# Patient Record
Sex: MALE | Race: WHITE | NOT HISPANIC OR LATINO | Employment: FULL TIME | URBAN - NONMETROPOLITAN AREA
[De-identification: names, ages, dates, MRNs, and addresses within clinical notes are randomized per-mention and may not be internally consistent; named-entity substitution may affect disease eponyms.]

---

## 2017-11-20 ENCOUNTER — HOSPITAL ENCOUNTER (EMERGENCY)
Facility: HOSPITAL | Age: 47
Discharge: HOME | End: 2017-11-20
Attending: FAMILY MEDICINE | Admitting: FAMILY MEDICINE

## 2017-11-20 ENCOUNTER — APPOINTMENT (OUTPATIENT)
Dept: CT IMAGING | Facility: HOSPITAL | Age: 47
End: 2017-11-20

## 2017-11-20 VITALS
OXYGEN SATURATION: 98 % | SYSTOLIC BLOOD PRESSURE: 112 MMHG | HEART RATE: 82 BPM | RESPIRATION RATE: 18 BRPM | TEMPERATURE: 98 F | HEIGHT: 73 IN | BODY MASS INDEX: 32.6 KG/M2 | WEIGHT: 246 LBS | DIASTOLIC BLOOD PRESSURE: 74 MMHG

## 2017-11-20 DIAGNOSIS — K57.92 DIVERTICULITIS OF INTESTINE WITHOUT PERFORATION OR ABSCESS WITHOUT BLEEDING, UNSPECIFIED PART OF INTESTINAL TRACT: Primary | ICD-10-CM

## 2017-11-20 LAB
ALBUMIN SERPL-MCNC: 4.4 G/DL (ref 3.5–5)
ALBUMIN/GLOB SERPL: 1.4 G/DL (ref 1.5–2.5)
ALP SERPL-CCNC: 119 U/L (ref 40–129)
ALT SERPL W P-5'-P-CCNC: 33 U/L (ref 10–44)
ANION GAP SERPL CALCULATED.3IONS-SCNC: 8 MMOL/L (ref 3.6–11.2)
APTT PPP: 31.4 SECONDS (ref 23.8–36.1)
AST SERPL-CCNC: 28 U/L (ref 10–34)
BASOPHILS # BLD AUTO: 0.04 10*3/MM3 (ref 0–0.3)
BASOPHILS NFR BLD AUTO: 0.3 % (ref 0–2)
BILIRUB SERPL-MCNC: 0.4 MG/DL (ref 0.2–1.8)
BILIRUB UR QL STRIP: NEGATIVE
BUN BLD-MCNC: 10 MG/DL (ref 7–21)
BUN/CREAT SERPL: 10.8 (ref 7–25)
CALCIUM SPEC-SCNC: 9.2 MG/DL (ref 7.7–10)
CHLORIDE SERPL-SCNC: 106 MMOL/L (ref 99–112)
CLARITY UR: CLEAR
CO2 SERPL-SCNC: 25 MMOL/L (ref 24.3–31.9)
COLOR UR: YELLOW
CREAT BLD-MCNC: 0.93 MG/DL (ref 0.43–1.29)
D-LACTATE SERPL-SCNC: 1.8 MMOL/L (ref 0.5–2)
DEPRECATED RDW RBC AUTO: 42.4 FL (ref 37–54)
EOSINOPHIL # BLD AUTO: 0.24 10*3/MM3 (ref 0–0.7)
EOSINOPHIL NFR BLD AUTO: 1.9 % (ref 0–5)
ERYTHROCYTE [DISTWIDTH] IN BLOOD BY AUTOMATED COUNT: 13.5 % (ref 11.5–14.5)
GFR SERPL CREATININE-BSD FRML MDRD: 87 ML/MIN/1.73
GLOBULIN UR ELPH-MCNC: 3.2 GM/DL
GLUCOSE BLD-MCNC: 141 MG/DL (ref 70–110)
GLUCOSE UR STRIP-MCNC: NEGATIVE MG/DL
HCT VFR BLD AUTO: 42.2 % (ref 42–52)
HGB BLD-MCNC: 14.8 G/DL (ref 14–18)
HGB UR QL STRIP.AUTO: NEGATIVE
IMM GRANULOCYTES # BLD: 0.04 10*3/MM3 (ref 0–0.03)
IMM GRANULOCYTES NFR BLD: 0.3 % (ref 0–0.5)
INR PPP: 1.06 (ref 0.9–1.1)
KETONES UR QL STRIP: NEGATIVE
LEUKOCYTE ESTERASE UR QL STRIP.AUTO: NEGATIVE
LIPASE SERPL-CCNC: 25 U/L (ref 13–60)
LYMPHOCYTES # BLD AUTO: 2.07 10*3/MM3 (ref 1–3)
LYMPHOCYTES NFR BLD AUTO: 16.6 % (ref 21–51)
MCH RBC QN AUTO: 30.6 PG (ref 27–33)
MCHC RBC AUTO-ENTMCNC: 35.1 G/DL (ref 33–37)
MCV RBC AUTO: 87.4 FL (ref 80–94)
MONOCYTES # BLD AUTO: 0.78 10*3/MM3 (ref 0.1–0.9)
MONOCYTES NFR BLD AUTO: 6.2 % (ref 0–10)
NEUTROPHILS # BLD AUTO: 9.33 10*3/MM3 (ref 1.4–6.5)
NEUTROPHILS NFR BLD AUTO: 74.7 % (ref 30–70)
NITRITE UR QL STRIP: NEGATIVE
OSMOLALITY SERPL CALC.SUM OF ELEC: 278.9 MOSM/KG (ref 273–305)
PH UR STRIP.AUTO: <=5 [PH] (ref 5–8)
PLATELET # BLD AUTO: 284 10*3/MM3 (ref 130–400)
PMV BLD AUTO: 9.9 FL (ref 6–10)
POTASSIUM BLD-SCNC: 3.4 MMOL/L (ref 3.5–5.3)
PROT SERPL-MCNC: 7.6 G/DL (ref 6–8)
PROT UR QL STRIP: NEGATIVE
PROTHROMBIN TIME: 13.9 SECONDS (ref 11–15.4)
RBC # BLD AUTO: 4.83 10*6/MM3 (ref 4.7–6.1)
SODIUM BLD-SCNC: 139 MMOL/L (ref 135–153)
SP GR UR STRIP: 1.02 (ref 1–1.03)
UROBILINOGEN UR QL STRIP: NORMAL
WBC NRBC COR # BLD: 12.5 10*3/MM3 (ref 4.5–12.5)

## 2017-11-20 PROCEDURE — 74177 CT ABD & PELVIS W/CONTRAST: CPT | Performed by: RADIOLOGY

## 2017-11-20 PROCEDURE — 99284 EMERGENCY DEPT VISIT MOD MDM: CPT

## 2017-11-20 PROCEDURE — 81003 URINALYSIS AUTO W/O SCOPE: CPT | Performed by: FAMILY MEDICINE

## 2017-11-20 PROCEDURE — 0 IOVERSOL 68 % SOLUTION: Performed by: FAMILY MEDICINE

## 2017-11-20 PROCEDURE — 83690 ASSAY OF LIPASE: CPT | Performed by: FAMILY MEDICINE

## 2017-11-20 PROCEDURE — 85610 PROTHROMBIN TIME: CPT | Performed by: FAMILY MEDICINE

## 2017-11-20 PROCEDURE — 80053 COMPREHEN METABOLIC PANEL: CPT | Performed by: FAMILY MEDICINE

## 2017-11-20 PROCEDURE — 85730 THROMBOPLASTIN TIME PARTIAL: CPT | Performed by: FAMILY MEDICINE

## 2017-11-20 PROCEDURE — 85025 COMPLETE CBC W/AUTO DIFF WBC: CPT | Performed by: FAMILY MEDICINE

## 2017-11-20 PROCEDURE — 74177 CT ABD & PELVIS W/CONTRAST: CPT

## 2017-11-20 PROCEDURE — 87040 BLOOD CULTURE FOR BACTERIA: CPT | Performed by: FAMILY MEDICINE

## 2017-11-20 PROCEDURE — 83605 ASSAY OF LACTIC ACID: CPT | Performed by: FAMILY MEDICINE

## 2017-11-20 RX ORDER — SODIUM CHLORIDE 0.9 % (FLUSH) 0.9 %
10 SYRINGE (ML) INJECTION AS NEEDED
Status: DISCONTINUED | OUTPATIENT
Start: 2017-11-20 | End: 2017-11-20 | Stop reason: HOSPADM

## 2017-11-20 RX ORDER — METRONIDAZOLE 500 MG/1
500 TABLET ORAL 2 TIMES DAILY
Qty: 20 TABLET | Refills: 0 | Status: SHIPPED | OUTPATIENT
Start: 2017-11-20 | End: 2019-04-10

## 2017-11-20 RX ORDER — METRONIDAZOLE 250 MG/1
500 TABLET ORAL ONCE
Status: COMPLETED | OUTPATIENT
Start: 2017-11-20 | End: 2017-11-20

## 2017-11-20 RX ORDER — CIPROFLOXACIN 750 MG/1
500 TABLET, FILM COATED ORAL 2 TIMES DAILY
COMMUNITY
End: 2020-09-23 | Stop reason: ALTCHOICE

## 2017-11-20 RX ADMIN — METRONIDAZOLE 500 MG: 250 TABLET ORAL at 07:54

## 2017-11-20 RX ADMIN — IOVERSOL 100 ML: 678 INJECTION INTRA-ARTERIAL; INTRAVENOUS at 07:02

## 2017-11-20 NOTE — ED PROVIDER NOTES
"Subjective   HPI Comments: 45 yo male presents with lower abd pain; pt says it began last week and on Friday went to PCP and they did an xray and thought \" it may be an obstruction\" so pt was given an rx for Linzess and Cipro and pt had abd pain and diarrhea all weekend; then driving to work this am he had severe cramping and pain in the right and left lower quadrant and felt that something was worsening and that he needed to be evaluated    Patient is a 46 y.o. male presenting with abdominal pain.   History provided by:  Patient  Abdominal Pain   Pain location:  LLQ and RLQ  Pain quality: cramping    Pain radiates to:  Does not radiate  Onset quality:  Gradual  Timing:  Intermittent  Progression:  Worsening  Chronicity:  New  Relieved by:  Nothing  Worsened by:  Nothing  Ineffective treatments:  Bowel activity  Associated symptoms: diarrhea    Associated symptoms: no chest pain, no chills, no cough, no dysuria, no fatigue, no nausea, no shortness of breath and no vomiting    Risk factors: no alcohol abuse, no aspirin use, not elderly, has not had multiple surgeries, no NSAID use, not obese, not pregnant and no recent hospitalization        Review of Systems   Constitutional: Negative for activity change, appetite change, chills and fatigue.   HENT: Negative for congestion.    Eyes: Negative for pain.   Respiratory: Negative for cough, shortness of breath, wheezing and stridor.    Cardiovascular: Negative for chest pain.   Gastrointestinal: Positive for abdominal pain and diarrhea. Negative for nausea and vomiting.   Genitourinary: Negative for dysuria.   Musculoskeletal: Negative for arthralgias, myalgias, neck pain and neck stiffness.   Skin: Negative for rash.   Neurological: Negative for dizziness, syncope, speech difficulty, weakness and headaches.   Psychiatric/Behavioral: Negative for agitation.       History reviewed. No pertinent past medical history.    No Known Allergies    Past Surgical History: "   Procedure Laterality Date   • CHOLECYSTECTOMY         History reviewed. No pertinent family history.    Social History     Social History   • Marital status:      Spouse name: N/A   • Number of children: N/A   • Years of education: N/A     Social History Main Topics   • Smoking status: Current Every Day Smoker     Packs/day: 0.50     Years: 30.00   • Smokeless tobacco: Never Used   • Alcohol use No   • Drug use: No   • Sexual activity: Defer     Other Topics Concern   • None     Social History Narrative   • None           Objective   Physical Exam   Constitutional: He is oriented to person, place, and time. He appears well-developed and well-nourished.   HENT:   Head: Normocephalic and atraumatic.   Right Ear: External ear normal.   Left Ear: External ear normal.   Nose: Nose normal.   Mouth/Throat: Oropharynx is clear and moist.   Eyes: EOM are normal. Pupils are equal, round, and reactive to light.   Neck: Neck supple.   Cardiovascular: Normal rate, regular rhythm, normal heart sounds and intact distal pulses.  Exam reveals no friction rub.    No murmur heard.  Pulmonary/Chest: Effort normal.   Abdominal: Soft. Bowel sounds are normal. He exhibits no distension and no mass. There is tenderness. There is no rebound and no guarding. No hernia.   Musculoskeletal: Normal range of motion.   Neurological: He is alert and oriented to person, place, and time.   Skin: Skin is warm.   Psychiatric: He has a normal mood and affect. His behavior is normal. Judgment and thought content normal.   Nursing note and vitals reviewed.      Procedures         ED Course  ED Course                  MDM  Number of Diagnoses or Management Options  Diverticulitis of intestine without perforation or abscess without bleeding, unspecified part of intestinal tract: new and does not require workup     Amount and/or Complexity of Data Reviewed  Clinical lab tests: ordered and reviewed  Tests in the radiology section of CPT®: reviewed  and ordered  Tests in the medicine section of CPT®: reviewed and ordered  Independent visualization of images, tracings, or specimens: yes    Risk of Complications, Morbidity, and/or Mortality  Presenting problems: moderate  Diagnostic procedures: moderate  Management options: moderate    Patient Progress  Patient progress: stable      Final diagnoses:   Diverticulitis of intestine without perforation or abscess without bleeding, unspecified part of intestinal tract            Valerie Christensen DO  11/20/17 4710

## 2017-11-20 NOTE — ED NOTES
2nd set of blood cultures drawn from pt's RAC. Pt stated he did not want his blood pressure cuff back on at this time.      Leighann Tomas  11/20/17 0668

## 2017-11-25 LAB
BACTERIA SPEC AEROBE CULT: NORMAL
BACTERIA SPEC AEROBE CULT: NORMAL

## 2019-04-10 ENCOUNTER — OFFICE VISIT (OUTPATIENT)
Dept: ORTHOPEDIC SURGERY | Facility: CLINIC | Age: 49
End: 2019-04-10

## 2019-04-10 VITALS — HEART RATE: 90 BPM | OXYGEN SATURATION: 98 % | WEIGHT: 248.24 LBS | BODY MASS INDEX: 32.9 KG/M2 | HEIGHT: 73 IN

## 2019-04-10 DIAGNOSIS — S43.003A SUBLUXATION OF TENDON OF LONG HEAD OF BICEPS: ICD-10-CM

## 2019-04-10 DIAGNOSIS — S46.012A RUPTURE OF LEFT SUPRASPINATUS TENDON, INITIAL ENCOUNTER: Primary | ICD-10-CM

## 2019-04-10 DIAGNOSIS — S46.812A PARTIAL TEAR OF LEFT SUBSCAPULARIS TENDON, INITIAL ENCOUNTER: ICD-10-CM

## 2019-04-10 PROCEDURE — 99204 OFFICE O/P NEW MOD 45 MIN: CPT | Performed by: ORTHOPAEDIC SURGERY

## 2019-04-10 RX ORDER — IBUPROFEN 200 MG
200 TABLET ORAL EVERY 6 HOURS PRN
COMMUNITY
End: 2020-09-23 | Stop reason: ALTCHOICE

## 2019-04-10 NOTE — PROGRESS NOTES
[facility] [place] [facility]opa[facility] Surgery Clinic Note    Subjective     Chief Complaint   Patient presents with   • Left Shoulder - Pain     Had an accident at work in November. Felt burning sensation. Hasn't gotten better.   Had PT, six sessions.  Pain wakes him at night.        CHRISTOPHER Chen is a 48 y.o. male.  He injured his left shoulder in November at work.  He works in a factory.  He is on restrictions no lift greater than 10 pounds no push pull more than 20 pounds.  Pain is 3 out of 10.  He has been in physical therapy.  He is tried anti-inflammatories.  He recently had an MRI.        History reviewed. No pertinent past medical history.   Past Surgical History:   Procedure Laterality Date   • CHOLECYSTECTOMY     • GALLBLADDER SURGERY        History reviewed. No pertinent family history.  Social History     Socioeconomic History   • Marital status:      Spouse name: Not on file   • Number of children: Not on file   • Years of education: Not on file   • Highest education level: Not on file   Tobacco Use   • Smoking status: Current Every Day Smoker     Packs/day: 0.50     Years: 30.00     Pack years: 15.00   • Smokeless tobacco: Never Used   Substance and Sexual Activity   • Alcohol use: No   • Drug use: No   • Sexual activity: Defer      Current Outpatient Medications on File Prior to Visit   Medication Sig Dispense Refill   • ciprofloxacin (CIPRO) 750 MG tablet Take 500 mg by mouth 2 (Two) Times a Day.     • ibuprofen (ADVIL,MOTRIN) 200 MG tablet Take 200 mg by mouth Every 6 (Six) Hours As Needed for Mild Pain .     • lansoprazole (PREVACID) 15 MG capsule Take 2 capsules by mouth Daily. 30 capsule 1   • [DISCONTINUED] metroNIDAZOLE (FLAGYL) 500 MG tablet Take 1 tablet by mouth 2 (Two) Times a Day. 20 tablet 0     No current facility-administered medications on file prior to visit.       No Known Allergies     The following portions of the patient's history were reviewed and updated as appropriate:  "allergies, current medications, past family history, past medical history, past social history, past surgical history and problem list.    Review of Systems   Constitutional: Positive for activity change (left shoulder).   HENT: Negative.    Eyes: Negative.    Respiratory: Negative.    Cardiovascular: Negative.    Gastrointestinal: Negative.    Endocrine: Negative.    Genitourinary: Negative.    Musculoskeletal: Positive for arthralgias (left shoulder).   Skin: Negative.    Allergic/Immunologic: Negative.    Neurological: Negative.    Hematological: Negative.    Psychiatric/Behavioral: Negative.         Objective      Physical Exam  Pulse 90   Ht 185.4 cm (73\")   Wt 113 kg (248 lb 3.8 oz)   SpO2 98%   BMI 32.75 kg/m²     Body mass index is 32.75 kg/m².        GENERAL APPEARANCE: awake, alert & oriented x 3, in no acute distress and well developed, well nourished  PSYCH: normal mood and affect  LUNGS:  breathing nonlabored, no wheezing  EYES: sclera anicteric, pupils equal  CARDIOVASCULAR: palpable pulses dorsalis pedis, palpable posterior tibial bilaterally. Capillary refill less than 2 seconds  INTEGUMENTARY: skin intact, no clubbing, cyanosis  NEUROLOGIC:  Normal Sensation and reflexes             Ortho Exam  Musculoskeletal   Upper Extremity   Left Shoulder     Inspection and Palpation:     Tenderness - none     Crepitus - none    Sensation is normal    Examination reveals no ecchymosis.      Strength and Tone:    Supraspinatus, Infraspinatus - 5/5    Subscapularis - 5/5    Deltoid - 5/5   Range of Motion   Right shoulder:    Internal Rotation: ROM - T7    External Rotation: AROM - 80 degrees    Elevation through flexion: AROM - 180 degrees     Abduction - 180   Left Shoulder:    Internal Rotation: ROM - T7    External Rotation: AROM - 80 degrees    Elevation through flexion: AROM - 180 degrees     Abduction - 180 degrees   Instability   Left shoulder    Sulcus sign negative    Apprehension test " negative    Kaila relocation test negative    Jerk test negative   Impingement   Left shoulder    Delacruz impingement test positive    Neer impingement test positive   Functional Testing   Left shoulder    AC crossover adduction test negative    Abdominal compression test negative    Lift-off sign negative    Speed's test negative    Barker's test negative    Horriblower's sign negative     Imaging/Studies  Imaging Results (last 7 days)     ** No results found for the last 168 hours. **        I viewed his MRI from March 27 which shows a full-thickness supraspinatus tear partial infraspinatus subscapularis tears and subluxation of the biceps tendon with biceps tendinitis  Assessment/Plan        ICD-10-CM ICD-9-CM   1. Rupture of left supraspinatus tendon, initial encounter S46.012A 840.6   2. Partial tear of left subscapularis tendon, initial encounter S43.82XA 840.5   3. Subluxation of tendon of long head of biceps S46.119A 840.8     The plan will be for left shoulder arthroscopy with rotator cuff repair of his full-thickness tear with biceps tenodesis.  He will continue restrictions of no lift more than 10 pounds no push pull more than 20 pounds.Treatment options and alternatives were discussed with patient.  Surgical versus non surgical treatment options were discussed as well.    We reviewed the nature of the planned procedure including the risks, benefits and potential complications.  Understanding was expressed and the decision was made to proceed with surgery.    Medical Decision Making  Management Options : over-the-counter medicine and major surgery with risk factors  Data/Risk: radiology tests and independent visualization of imaging, lab tests, or EMG/NCV    Frank Heath MD  04/10/19  11:11 AM         EMR Dragon/Transcription disclaimer:  Much of this encounter note is an electronic transcription of spoken language to printed text. Electronic transcription of spoken language may permit erroneous, or  at times, nonsensical words or phrases to be inadvertently transcribed. Although I have reviewed the note for such errors, some may still exist.

## 2019-04-23 ENCOUNTER — TELEPHONE (OUTPATIENT)
Dept: ORTHOPEDIC SURGERY | Facility: CLINIC | Age: 49
End: 2019-04-23

## 2019-04-23 NOTE — TELEPHONE ENCOUNTER
PATIENT LEFT MESSAGE TO RETURN HIS CALL ABOUT [employer] APPROVAL.  I CALLED HIS CELL PHONE AND HOME PHONE, CELL HAD NO ANSWER OR VOICEMAIL, HOME NO ANSWER AND LEFT A VOICEMAIL.  I LEFT A MESSAGE REMINDING THE PATIENT THAT [employer] APPROVALS TYPICALLY TAKE 2-3 WEEKS TO RECEIVE AND THAT I HAVE NOT HEARD FROM THEM AS OF TODAY.  I ALSO RELAYED TO HIM THAT HE CAN CALL HIS  AND GET INFORMATION FROM THEM IN REGARDS TO WHERE THEY  THE PROCESS OF APPROVAL.

## 2019-05-16 ENCOUNTER — TELEPHONE (OUTPATIENT)
Dept: ORTHOPEDIC SURGERY | Facility: CLINIC | Age: 49
End: 2019-05-16

## 2019-05-17 NOTE — TELEPHONE ENCOUNTER
CALLED PATIENT TO ADVISE OF SURGERY APPROVAL FROM [employer], NO ANSWER, LEFT MESSAGE TO RETURN MY CALL.  
PATIENT RETURNED CALL AND HAS BEEN SCHEDULED FOR SURGERY WITH DR MILLIGAN ON 5/21/19.  
medical evaluation

## 2019-05-20 ENCOUNTER — TELEPHONE (OUTPATIENT)
Dept: ORTHOPEDIC SURGERY | Facility: CLINIC | Age: 49
End: 2019-05-20

## 2019-05-20 NOTE — TELEPHONE ENCOUNTER
CALLED PATIENT TO ADVISE OF 7:30AM ARRIVAL TIME FOR SURGERY ON 5/21/19 WITH DR MILLIGAN.  NO ANSWER, LEFT VOICEMAIL WITH DETAILS AND REQUEST TO RETURN CALL CONFIRMING RECEIPT OF MESSAGE.

## 2019-05-21 ENCOUNTER — OUTSIDE FACILITY SERVICE (OUTPATIENT)
Dept: ORTHOPEDIC SURGERY | Facility: CLINIC | Age: 49
End: 2019-05-21

## 2019-05-21 PROCEDURE — 29828 SHO ARTHRS SRG BICP TENODSIS: CPT | Performed by: ORTHOPAEDIC SURGERY

## 2019-05-21 PROCEDURE — 29827 SHO ARTHRS SRG RT8TR CUF RPR: CPT | Performed by: ORTHOPAEDIC SURGERY

## 2019-05-29 ENCOUNTER — OFFICE VISIT (OUTPATIENT)
Dept: ORTHOPEDIC SURGERY | Facility: CLINIC | Age: 49
End: 2019-05-29

## 2019-05-29 DIAGNOSIS — Z02.6 ENCOUNTER RELATED TO WORKER'S COMPENSATION CLAIM: Primary | ICD-10-CM

## 2019-05-29 DIAGNOSIS — Z98.890 STATUS POST LEFT ROTATOR CUFF REPAIR: ICD-10-CM

## 2019-05-29 PROCEDURE — 99024 POSTOP FOLLOW-UP VISIT: CPT | Performed by: ORTHOPAEDIC SURGERY

## 2019-06-19 ENCOUNTER — OFFICE VISIT (OUTPATIENT)
Dept: ORTHOPEDIC SURGERY | Facility: CLINIC | Age: 49
End: 2019-06-19

## 2019-06-19 DIAGNOSIS — Z02.6 ENCOUNTER RELATED TO WORKER'S COMPENSATION CLAIM: Primary | ICD-10-CM

## 2019-06-19 DIAGNOSIS — Z98.890 STATUS POST LEFT ROTATOR CUFF REPAIR: ICD-10-CM

## 2019-06-19 PROCEDURE — 99024 POSTOP FOLLOW-UP VISIT: CPT | Performed by: ORTHOPAEDIC SURGERY

## 2019-06-19 NOTE — PROGRESS NOTES
Chief Complaint   Patient presents with   • Post-op     3 week f/u- 4 weeks status post Left Shoulder Arthroscopy with Cuff Repair and Biceps Tendon 05/21/2019           HPI    He is follow-up left shoulder cuff repair with biceps tenodesis May 21.  He is having minimal to no pain and rates it a 1 out of 10 occasionally.  He says he is already been trying to lift his arm.  He can do it on his own and had to use a rope.  I reminded him he was not supposed to be doing that yet.    There were no vitals filed for this visit.      Physical Exam:  He is neurovascular intact.  He has minimal active motion of the shoulder.      ICD-10-CM ICD-9-CM   1. Encounter related to worker's compensation claim Z02.6 V70.3   2. Status post left rotator cuff repair Z98.890 V45.89       Orders Placed This Encounter   Procedures   • Ambulatory Referral to Physical Therapy     He will officially start to move the arm now and discontinue the sling.  I will see him back in a month.  His work restriction is no use left arm.

## 2019-07-17 ENCOUNTER — OFFICE VISIT (OUTPATIENT)
Dept: ORTHOPEDIC SURGERY | Facility: CLINIC | Age: 49
End: 2019-07-17

## 2019-07-17 DIAGNOSIS — Z98.890 STATUS POST LEFT ROTATOR CUFF REPAIR: Primary | ICD-10-CM

## 2019-07-17 DIAGNOSIS — Z02.6 ENCOUNTER RELATED TO WORKER'S COMPENSATION CLAIM: ICD-10-CM

## 2019-07-17 PROCEDURE — 99024 POSTOP FOLLOW-UP VISIT: CPT | Performed by: ORTHOPAEDIC SURGERY

## 2019-07-17 RX ORDER — ETODOLAC 500 MG/1
500 TABLET, FILM COATED ORAL 2 TIMES DAILY
Qty: 60 TABLET | Refills: 3 | Status: SHIPPED | OUTPATIENT
Start: 2019-07-17 | End: 2019-09-16

## 2019-07-17 NOTE — PROGRESS NOTES
[facility] [place] Orthopaedic Surgery Clinic Note    Subjective     Chief Complaint   Patient presents with   • Post-op     1 month f/u; 8 weeks status post Left Shoulder Arthroscopy with Cuff Repair and Biceps Tendon 05/21/2019        CHRISTOPHER Chen is a 48 y.o. male.  He is 2 months out from left shoulder cuff repair.  He is doing physical therapy.    History reviewed. No pertinent past medical history.   Past Surgical History:   Procedure Laterality Date   • CHOLECYSTECTOMY     • GALLBLADDER SURGERY        Family History   Problem Relation Age of Onset   • No Known Problems Mother    • No Known Problems Father      Social History     Socioeconomic History   • Marital status:      Spouse name: Not on file   • Number of children: Not on file   • Years of education: Not on file   • Highest education level: Not on file   Tobacco Use   • Smoking status: Current Every Day Smoker     Packs/day: 0.50     Years: 30.00     Pack years: 15.00   • Smokeless tobacco: Never Used   Substance and Sexual Activity   • Alcohol use: No   • Drug use: No   • Sexual activity: Defer      Current Outpatient Medications on File Prior to Visit   Medication Sig Dispense Refill   • ciprofloxacin (CIPRO) 750 MG tablet Take 500 mg by mouth 2 (Two) Times a Day.     • ibuprofen (ADVIL,MOTRIN) 200 MG tablet Take 200 mg by mouth Every 6 (Six) Hours As Needed for Mild Pain .     • lansoprazole (PREVACID) 15 MG capsule Take 2 capsules by mouth Daily. 30 capsule 1     No current facility-administered medications on file prior to visit.       No Known Allergies     The following portions of the patient's history were reviewed and updated as appropriate: allergies, current medications, past family history, past medical history, past social history, past surgical history and problem list.    Review of Systems   Constitutional: Negative.    HENT: Negative.    Eyes: Negative.    Respiratory: Negative.    Cardiovascular: Negative.    Gastrointestinal:  Negative.    Endocrine: Negative.    Genitourinary: Negative.    Musculoskeletal:        Left Shoulder Arthroscopy with Cuff Repair and Biceps Tendon f/u   Skin: Negative.    Allergic/Immunologic: Negative.    Neurological: Negative.    Hematological: Negative.    Psychiatric/Behavioral: Negative.         Objective      Physical Exam  There were no vitals taken for this visit.    There is no height or weight on file to calculate BMI.    GENERAL APPEARANCE: awake, alert & oriented x 3, in no acute distress and well developed, well nourished  PSYCH: normal mood and affect       Ortho Exam  Left shoulder has full motion.  He has weakness.  He has not started strengthening yet.    Imaging/Studies  Imaging Results (last 7 days)     ** No results found for the last 168 hours. **          Assessment/Plan        ICD-10-CM ICD-9-CM   1. Status post left rotator cuff repair Z98.890 V45.89   2. Encounter related to worker's compensation claim Z02.6 V70.3       Orders Placed This Encounter   Procedures   • Ambulatory Referral to Physical Therapy      I ordered his anti-inflammatory.  He will follow-up in 1 month.  His work restriction is no lifting left arm.    Medical Decision Making  Management Options : prescription/IM medicine and physical/occupational therapy      Frank Heath MD  07/17/19  8:42 AM         EMR Dragon/Transcription disclaimer:  Much of this encounter note is an electronic transcription of spoken language to printed text. Electronic transcription of spoken language may permit erroneous, or at times, nonsensical words or phrases to be inadvertently transcribed. Although I have reviewed the note for such errors, some may still exist.

## 2019-08-14 ENCOUNTER — OFFICE VISIT (OUTPATIENT)
Dept: ORTHOPEDIC SURGERY | Facility: CLINIC | Age: 49
End: 2019-08-14

## 2019-08-14 DIAGNOSIS — Z98.890 STATUS POST LEFT ROTATOR CUFF REPAIR: ICD-10-CM

## 2019-08-14 DIAGNOSIS — Z02.6 ENCOUNTER RELATED TO WORKER'S COMPENSATION CLAIM: Primary | ICD-10-CM

## 2019-08-14 PROCEDURE — 99024 POSTOP FOLLOW-UP VISIT: CPT | Performed by: ORTHOPAEDIC SURGERY

## 2019-08-14 NOTE — PROGRESS NOTES
Chief Complaint   Patient presents with   • Post-op     1 month f/u; 12 weeks status post Left Shoulder Arthroscopy with Cuff Repair and Biceps Tendon 05/21/2019           HPI    He is almost 3 months out from left shoulder cuff repair.  He complains of pain but says anti-inflammatories are working and he cannot take Tylenol as it has not worked since he had his gallbladder removed.    There were no vitals filed for this visit.      Physical Exam:  He has full motion.  He is 4 out of 5 strength.        ICD-10-CM ICD-9-CM   1. Encounter related to worker's compensation claim Z02.6 V70.3   2. Status post left rotator cuff repair Z98.890 V45.89       Orders Placed This Encounter   Procedures   • Ambulatory Referral to Physical Therapy     He will start strengthening with physical therapy and follow-up in 1 month.  His work restrictions are no lifting left arm.

## 2019-09-16 ENCOUNTER — OFFICE VISIT (OUTPATIENT)
Dept: ORTHOPEDIC SURGERY | Facility: CLINIC | Age: 49
End: 2019-09-16

## 2019-09-16 VITALS — BODY MASS INDEX: 32.76 KG/M2 | OXYGEN SATURATION: 98 % | WEIGHT: 247.2 LBS | HEIGHT: 73 IN | HEART RATE: 81 BPM

## 2019-09-16 DIAGNOSIS — Z98.890 STATUS POST LEFT ROTATOR CUFF REPAIR: ICD-10-CM

## 2019-09-16 DIAGNOSIS — F17.200 SMOKER UNMOTIVATED TO QUIT: ICD-10-CM

## 2019-09-16 DIAGNOSIS — Z02.6 ENCOUNTER RELATED TO WORKER'S COMPENSATION CLAIM: Primary | ICD-10-CM

## 2019-09-16 DIAGNOSIS — M75.92 SUPRASPINATUS TENDINITIS, LEFT: ICD-10-CM

## 2019-09-16 PROCEDURE — 99213 OFFICE O/P EST LOW 20 MIN: CPT | Performed by: ORTHOPAEDIC SURGERY

## 2019-09-16 RX ORDER — NAPROXEN 500 MG/1
500 TABLET ORAL 2 TIMES DAILY
Qty: 180 TABLET | Refills: 3 | Status: SHIPPED | OUTPATIENT
Start: 2019-09-16 | End: 2020-09-23 | Stop reason: ALTCHOICE

## 2019-09-16 NOTE — PROGRESS NOTES
[place] [facility] Note    Subjective     Chief Complaint   Patient presents with   • Left Shoulder - Follow-up     4 months status post Left Shoulder Arthroscopy with Cuff Repair and Biceps Tendon 05/21/2019        CHRISTOPHER Chen is a 48 y.o. male.  He is 4 months out from left shoulder rotator cuff repair.  He is doing better but still has some burning pain over the shoulder blade.  He continues to smoke.  His pain is 1 out of 10.    History reviewed. No pertinent past medical history.   Past Surgical History:   Procedure Laterality Date   • CHOLECYSTECTOMY     • GALLBLADDER SURGERY        Family History   Problem Relation Age of Onset   • No Known Problems Mother    • No Known Problems Father      Social History     Socioeconomic History   • Marital status:      Spouse name: Not on file   • Number of children: Not on file   • Years of education: Not on file   • Highest education level: Not on file   Tobacco Use   • Smoking status: Current Every Day Smoker     Packs/day: 0.50     Years: 30.00     Pack years: 15.00   • Smokeless tobacco: Never Used   Substance and Sexual Activity   • Alcohol use: No   • Drug use: No   • Sexual activity: Defer      Current Outpatient Medications on File Prior to Visit   Medication Sig Dispense Refill   • ciprofloxacin (CIPRO) 750 MG tablet Take 500 mg by mouth 2 (Two) Times a Day.     • ibuprofen (ADVIL,MOTRIN) 200 MG tablet Take 200 mg by mouth Every 6 (Six) Hours As Needed for Mild Pain .     • lansoprazole (PREVACID) 15 MG capsule Take 2 capsules by mouth Daily. 30 capsule 1   • [DISCONTINUED] etodolac (LODINE) 500 MG tablet Take 1 tablet by mouth 2 (Two) Times a Day. 60 tablet 3     No current facility-administered medications on file prior to visit.       No Known Allergies     The following portions of the patient's history were reviewed and updated as appropriate: allergies, current medications, past family history, past medical history, past  "social history, past surgical history and problem list.    Review of Systems   Constitutional: Negative.    HENT: Negative.    Eyes: Negative.    Respiratory: Negative.    Cardiovascular: Negative.    Gastrointestinal: Negative.    Endocrine: Negative.    Genitourinary: Negative.    Musculoskeletal: Positive for arthralgias.   Skin: Negative.    Allergic/Immunologic: Negative.    Neurological: Negative.    Hematological: Negative.    Psychiatric/Behavioral: Negative.         Objective      Physical Exam  Pulse 81   Ht 185.4 cm (72.99\")   Wt 112 kg (247 lb 3.2 oz)   SpO2 98%   BMI 32.62 kg/m²     Body mass index is 32.62 kg/m².    GENERAL APPEARANCE: awake, alert & oriented x 3, in no acute distress and well developed, well nourished  PSYCH: normal mood and affect      Ortho Exam  Left shoulder has full forward flexion abduction.  He lacks internal rotation on the left.  His strength is 4 out of 5.    Imaging/Studies  Imaging Results (last 7 days)     ** No results found for the last 168 hours. **          Assessment/Plan        ICD-10-CM ICD-9-CM   1. Encounter related to worker's compensation claim Z02.6 V70.3   2. Status post left rotator cuff repair Z98.890 V45.89   3. Supraspinatus tendinitis, left M75.92 726.10   4. Smoker unmotivated to quit F17.200 305.1       Orders Placed This Encounter   Procedures   • Ambulatory Referral to Physical Therapy      I have ordered Naprosyn to treat his inflammation.  He will continue physical therapy status post cuff repair.  I encouraged him to stop smoking.  His work restriction is no lifting left arm.  He will follow-up in 1 month.  I anticipate MMI in 2 more months.    Medical Decision Making  Management Options : prescription/IM medicine and physical/occupational therapy    Frank Heath MD  09/16/19  8:02 AM         EMR Dragon/Transcription disclaimer:  Much of this encounter note is an electronic transcription of spoken language to printed text. Electronic " transcription of spoken language may permit erroneous, or at times, nonsensical words or phrases to be inadvertently transcribed. Although I have reviewed the note for such errors, some may still exist.

## 2019-10-16 ENCOUNTER — OFFICE VISIT (OUTPATIENT)
Dept: ORTHOPEDIC SURGERY | Facility: CLINIC | Age: 49
End: 2019-10-16

## 2019-10-16 VITALS — HEIGHT: 73 IN | OXYGEN SATURATION: 98 % | WEIGHT: 248 LBS | BODY MASS INDEX: 32.87 KG/M2 | HEART RATE: 82 BPM

## 2019-10-16 DIAGNOSIS — Z98.890 STATUS POST LEFT ROTATOR CUFF REPAIR: ICD-10-CM

## 2019-10-16 DIAGNOSIS — M75.92 SUPRASPINATUS TENDINITIS, LEFT: ICD-10-CM

## 2019-10-16 DIAGNOSIS — Z02.6 ENCOUNTER RELATED TO WORKER'S COMPENSATION CLAIM: Primary | ICD-10-CM

## 2019-10-16 PROCEDURE — 99212 OFFICE O/P EST SF 10 MIN: CPT | Performed by: ORTHOPAEDIC SURGERY

## 2019-10-16 RX ORDER — ETODOLAC 500 MG/1
500 TABLET, FILM COATED ORAL AS NEEDED
COMMUNITY
End: 2021-06-11

## 2019-10-16 NOTE — PROGRESS NOTES
[facility] – [place] [facility]    Subjective     Chief Complaint   Patient presents with   • Left Shoulder - Follow-up     Left Shoulder Arthroscopy with Cuff Repair and Biceps Tendon 05/21/2019        HPI  Obi Chen is a 48 y.o. male.  He is 5 months out from left shoulder rotator cuff repair.  He says he does well with everything except overhead lifting.  He said he does not need a work restriction.  He is taking anti-inflammatories.  He has burning pain over his scapula.    History reviewed. No pertinent past medical history.   Past Surgical History:   Procedure Laterality Date   • CHOLECYSTECTOMY     • GALLBLADDER SURGERY        Family History   Problem Relation Age of Onset   • No Known Problems Mother    • No Known Problems Father      Social History     Socioeconomic History   • Marital status:      Spouse name: Not on file   • Number of children: Not on file   • Years of education: Not on file   • Highest education level: Not on file   Tobacco Use   • Smoking status: Current Every Day Smoker     Packs/day: 0.50     Years: 30.00     Pack years: 15.00   • Smokeless tobacco: Never Used   Substance and Sexual Activity   • Alcohol use: No   • Drug use: No   • Sexual activity: Defer      Current Outpatient Medications on File Prior to Visit   Medication Sig Dispense Refill   • ciprofloxacin (CIPRO) 750 MG tablet Take 500 mg by mouth 2 (Two) Times a Day.     • etodolac (LODINE) 500 MG tablet Take 500 mg by mouth As Needed.     • naproxen (NAPROSYN) 500 MG tablet Take 1 tablet by mouth 2 (Two) Times a Day. 180 tablet 3   • ibuprofen (ADVIL,MOTRIN) 200 MG tablet Take 200 mg by mouth Every 6 (Six) Hours As Needed for Mild Pain .     • lansoprazole (PREVACID) 15 MG capsule Take 2 capsules by mouth Daily. 30 capsule 1     No current facility-administered medications on file prior to visit.       No Known Allergies     The following portions of the patient's history were reviewed and updated as  "appropriate: allergies, current medications, past family history, past medical history, past social history, past surgical history and problem list.    Review of Systems   Constitutional: Negative.    HENT: Negative.    Eyes: Negative.    Respiratory: Negative.    Cardiovascular: Negative.    Gastrointestinal: Negative.    Endocrine: Negative.    Genitourinary: Negative.    Musculoskeletal: Positive for arthralgias.   Skin: Negative.    Allergic/Immunologic: Negative.    Neurological: Negative.    Hematological: Negative.    Psychiatric/Behavioral: Negative.         Objective      Physical Exam  Pulse 82   Ht 185.4 cm (72.99\")   Wt 112 kg (248 lb)   SpO2 98%   BMI 32.73 kg/m²     Body mass index is 32.73 kg/m².    GENERAL APPEARANCE: awake, alert & oriented x 3, in no acute distress and well developed, well nourished  PSYCH: normal mood and affect    Ortho Exam  His left shoulder exam is unchanged.  He is neurovascular intact.  He has some tenderness over his supraspinatus.  He has full motion and 4 out of 5 strength.    Imaging/Studies  Imaging Results (last 7 days)     ** No results found for the last 168 hours. **          Assessment/Plan        ICD-10-CM ICD-9-CM   1. Encounter related to worker's compensation claim Z02.6 V70.3   2. Status post left rotator cuff repair Z98.890 V45.89   3. Supraspinatus tendinitis, left M75.92 726.10       Orders Placed This Encounter   Procedures   • Ambulatory Referral to Physical Therapy      He will continue physical therapy.  His work restriction is no lifting overhead.  He does not have a weight limit restriction.  I will see him back in 1 month.  I anticipate [facility]MI in 1 month.  Medical Decision Making  Management Options : over-the-counter medicine and physical/occupational therapy      Frank Heath MD  10/16/19  8:04 AM         EMR Dragon/Transcription disclaimer:  Much of this encounter note is an electronic transcription of spoken language to printed text. " Electronic transcription of spoken language may permit erroneous, or at times, nonsensical words or phrases to be inadvertently transcribed. Although I have reviewed the note for such errors, some may still exist.

## 2019-11-13 ENCOUNTER — OFFICE VISIT (OUTPATIENT)
Dept: ORTHOPEDIC SURGERY | Facility: CLINIC | Age: 49
End: 2019-11-13

## 2019-11-13 VITALS — HEART RATE: 100 BPM | WEIGHT: 246.91 LBS | HEIGHT: 73 IN | OXYGEN SATURATION: 98 % | BODY MASS INDEX: 32.72 KG/M2

## 2019-11-13 DIAGNOSIS — Z98.890 STATUS POST LEFT ROTATOR CUFF REPAIR: ICD-10-CM

## 2019-11-13 DIAGNOSIS — Z02.6 ENCOUNTER RELATED TO WORKER'S COMPENSATION CLAIM: Primary | ICD-10-CM

## 2019-11-13 PROCEDURE — 99212 OFFICE O/P EST SF 10 MIN: CPT | Performed by: ORTHOPAEDIC SURGERY

## 2019-11-13 NOTE — PROGRESS NOTES
[facility] – [place] [facility] Note    Subjective     Chief Complaint   Patient presents with   • Left Shoulder - Follow-up     5.5 months post Left Shoulder Arthroscopy with Cuff Repair and Biceps Tendon 05/21/2019        CHRISTOPHER Chen is a 48 y.o. male.  He is almost 6 months out from left shoulder cuff repair and biceps tenodesis.  He says he has some stiffness but believes he can do his regular job.  He is aggravated with lifting overhead but believes he can be full duty.    Past Medical History:   Diagnosis Date   • Diverticulitis       Past Surgical History:   Procedure Laterality Date   • CHOLECYSTECTOMY     • GALLBLADDER SURGERY        Family History   Problem Relation Age of Onset   • No Known Problems Mother    • No Known Problems Father      Social History     Socioeconomic History   • Marital status:      Spouse name: Not on file   • Number of children: Not on file   • Years of education: Not on file   • Highest education level: Not on file   Tobacco Use   • Smoking status: Current Every Day Smoker     Packs/day: 0.50     Years: 30.00     Pack years: 15.00     Types: Cigarettes   • Smokeless tobacco: Never Used   Substance and Sexual Activity   • Alcohol use: Yes     Comment: weekly   • Drug use: No   • Sexual activity: Defer      Current Outpatient Medications on File Prior to Visit   Medication Sig Dispense Refill   • ciprofloxacin (CIPRO) 750 MG tablet Take 500 mg by mouth 2 (Two) Times a Day.     • etodolac (LODINE) 500 MG tablet Take 500 mg by mouth As Needed.     • ibuprofen (ADVIL,MOTRIN) 200 MG tablet Take 200 mg by mouth Every 6 (Six) Hours As Needed for Mild Pain .     • lansoprazole (PREVACID) 15 MG capsule Take 2 capsules by mouth Daily. 30 capsule 1   • naproxen (NAPROSYN) 500 MG tablet Take 1 tablet by mouth 2 (Two) Times a Day. 180 tablet 3     No current facility-administered medications on file prior to visit.       No Known Allergies     The following portions of the  "patient's history were reviewed and updated as appropriate: allergies, current medications, past family history, past medical history, past social history, past surgical history and problem list.    Review of Systems   Constitutional: Negative.    HENT: Negative.    Eyes: Negative.    Respiratory: Negative.    Cardiovascular: Negative.    Gastrointestinal: Positive for constipation, diarrhea and vomiting.   Endocrine: Negative.    Genitourinary: Negative.    Musculoskeletal: Positive for arthralgias.   Skin: Negative.    Allergic/Immunologic: Negative.    Neurological: Negative.    Hematological: Negative.    Psychiatric/Behavioral: Negative.         Objective      Physical Exam  Pulse 100   Ht 185.4 cm (72.99\")   Wt 112 kg (246 lb 14.6 oz)   SpO2 98%   BMI 32.58 kg/m²     Body mass index is 32.58 kg/m².    GENERAL APPEARANCE: awake, alert & oriented x 3, in no acute distress and well developed, well nourished  PSYCH: normal mood and affect    Ortho Exam  He has full motion.  He has 5 out of 5 strength of his cuff.  He has some positive impingement signs.    Imaging/Studies  Imaging Results (Last 7 Days)     ** No results found for the last 168 hours. **          Assessment/Plan        ICD-10-CM ICD-9-CM   1. Encounter related to worker's compensation claim Z02.6 V70.3   2. Status post left rotator cuff repair Z98.890 V45.89     He is at [facility].  I will release him to full duty without restrictions.  I will see him back as needed.  He will continue with a home exercise program.  Medical Decision Making  Management Options : over-the-counter medicine    Frank Heath MD  11/13/19  8:27 AM         EMR Dragon/Transcription disclaimer:  Much of this encounter note is an electronic transcription of spoken language to printed text. Electronic transcription of spoken language may permit erroneous, or at times, nonsensical words or phrases to be inadvertently transcribed. Although I have reviewed the note for such " errors, some may still exist.

## 2020-09-23 ENCOUNTER — TELEPHONE (OUTPATIENT)
Dept: ORTHOPEDIC SURGERY | Facility: CLINIC | Age: 50
End: 2020-09-23

## 2020-09-23 ENCOUNTER — OFFICE VISIT (OUTPATIENT)
Dept: ORTHOPEDIC SURGERY | Facility: CLINIC | Age: 50
End: 2020-09-23

## 2020-09-23 VITALS — HEART RATE: 81 BPM | OXYGEN SATURATION: 98 % | BODY MASS INDEX: 31.94 KG/M2 | WEIGHT: 242 LBS

## 2020-09-23 DIAGNOSIS — Z98.890 STATUS POST LEFT ROTATOR CUFF REPAIR: Primary | ICD-10-CM

## 2020-09-23 DIAGNOSIS — Z02.6 ENCOUNTER RELATED TO WORKER'S COMPENSATION CLAIM: ICD-10-CM

## 2020-09-23 DIAGNOSIS — M25.512 ACUTE PAIN OF LEFT SHOULDER: ICD-10-CM

## 2020-09-23 DIAGNOSIS — M25.812 OS ACROMIALE OF LEFT SHOULDER: ICD-10-CM

## 2020-09-23 PROCEDURE — 99213 OFFICE O/P EST LOW 20 MIN: CPT | Performed by: ORTHOPAEDIC SURGERY

## 2020-09-23 RX ORDER — METRONIDAZOLE 500 MG/1
500 TABLET ORAL 3 TIMES DAILY
COMMUNITY
Start: 2020-09-15 | End: 2020-10-21

## 2020-09-23 RX ORDER — CIPROFLOXACIN 500 MG/1
500 TABLET, FILM COATED ORAL 2 TIMES DAILY
COMMUNITY
Start: 2020-09-15 | End: 2020-10-21

## 2020-09-23 RX ORDER — CELECOXIB 400 MG/1
400 CAPSULE ORAL DAILY
COMMUNITY
Start: 2020-09-15 | End: 2021-06-11

## 2020-09-23 NOTE — TELEPHONE ENCOUNTER
PATIENT CALLED REQUESTING TO CHANGE WEIGHT RESTRICTIONS TO 10 POUNDS. PATIENT STATED THE LETTER TO BE FAXED [phone]. PATIENT CAN BE REACHED [phone]

## 2020-09-23 NOTE — PROGRESS NOTES
[facility] – [place] [facility] Note    Subjective     Chief Complaint   Patient presents with   • Follow-up     1 year Left Shoulder Arthroscopy with Cuff Repair and Biceps Tendon 05/21/2019        HPI  Obi Chen is a 49 y.o. male.  He says he reinjured his left shoulder work about a month ago.  He was lifting something heavy and has had pain ever since.  Not getting better.  He is concerned re-tore shoulder.  He says the  would approve an MRI.    Past Medical History:   Diagnosis Date   • Diverticulitis       Past Surgical History:   Procedure Laterality Date   • CHOLECYSTECTOMY     • GALLBLADDER SURGERY        Family History   Problem Relation Age of Onset   • No Known Problems Mother    • No Known Problems Father      Social History     Socioeconomic History   • Marital status:      Spouse name: Not on file   • Number of children: Not on file   • Years of education: Not on file   • Highest education level: Not on file   Tobacco Use   • Smoking status: Current Every Day Smoker     Packs/day: 0.50     Years: 30.00     Pack years: 15.00     Types: Cigarettes   • Smokeless tobacco: Never Used   Substance and Sexual Activity   • Alcohol use: Yes     Comment: weekly   • Drug use: No   • Sexual activity: Defer      Current Outpatient Medications on File Prior to Visit   Medication Sig Dispense Refill   • celecoxib (CeleBREX) 400 MG capsule Take 400 mg by mouth Daily.     • ciprofloxacin (CIPRO) 500 MG tablet Take 500 mg by mouth 2 (Two) Times a Day.     • etodolac (LODINE) 500 MG tablet Take 500 mg by mouth As Needed.     • lansoprazole (PREVACID) 15 MG capsule Take 2 capsules by mouth Daily. 30 capsule 1   • metroNIDAZOLE (FLAGYL) 500 MG tablet Take 500 mg by mouth 3 (Three) Times a Day.     • [DISCONTINUED] ciprofloxacin (CIPRO) 750 MG tablet Take 500 mg by mouth 2 (Two) Times a Day.     • [DISCONTINUED] ibuprofen (ADVIL,MOTRIN) 200 MG tablet Take 200 mg by mouth Every 6 (Six) Hours As  Needed for Mild Pain .     • [DISCONTINUED] naproxen (NAPROSYN) 500 MG tablet Take 1 tablet by mouth 2 (Two) Times a Day. 180 tablet 3     No current facility-administered medications on file prior to visit.       No Known Allergies     The following portions of the patient's history were reviewed and updated as appropriate: allergies, current medications, past family history, past medical history, past social history, past surgical history and problem list.    Review of Systems   Constitutional: Negative.    HENT: Negative.    Eyes: Negative.    Respiratory: Negative.    Cardiovascular: Negative.    Gastrointestinal: Negative.    Endocrine: Negative.    Genitourinary: Negative.    Musculoskeletal: Positive for arthralgias and joint swelling.   Skin: Negative.    Allergic/Immunologic: Negative.    Neurological: Negative.    Hematological: Negative.    Psychiatric/Behavioral: Negative.         Objective      Physical Exam  Pulse 81   Wt 110 kg (242 lb)   SpO2 98%   BMI 31.94 kg/m²     Body mass index is 31.94 kg/m².    GENERAL APPEARANCE: awake, alert & oriented x 3, in no acute distress and well developed, well nourished  PSYCH: normal mood and affect  LUNGS:  breathing nonlabored, no wheezing  Left shoulder has near full motion.  Strength 4-5.  Positive impingement.  Imaging/Studies  Imaging Results (Last 7 Days)     Procedure Component Value Units Date/Time    XR Shoulder 2+ View Left [[id number]] Resulted: 09/23/20 1016     Updated: 09/23/20 1017    Narrative:      Left Shoulder X-Ray  Indication: Pain  AP, scapular Y, and axillary lateral views    Findings: os acromion  No fracture  No bony lesion  Normal soft tissues  Normal joint spaces    No prior studies were available for comparison.          Assessment/Plan        ICD-10-CM ICD-9-CM   1. Status post left rotator cuff repair  Z98.890 V45.89   2. Encounter related to worker's compensation claim  Z02.6 V70.3   3. Acute pain of left shoulder  M25.512 719.41    4. Os acromiale of left shoulder  M25.812 719.81       Orders Placed This Encounter   Procedures   • XR Shoulder 2+ View Left   • MRI Shoulder Left With & Without Contrast        Concern is he re-tore his left rotator cuff.  Work restrictions are no lifting left arm.  I ordered an MRI.  He will follow-up after the MRI.  He will like it today.    Medical Decision Making  Management Options : over-the-counter medicine  Data/Risk: radiology tests and independent visualization of imaging, lab tests, or EMG/NCV    Frank Heath MD  09/23/20  10:19 EDT         EMR Dragon/Transcription disclaimer:  Much of this encounter note is an electronic transcription of spoken language to printed text. Electronic transcription of spoken language may permit erroneous, or at times, nonsensical words or phrases to be inadvertently transcribed. Although I have reviewed the note for such errors, some may still exist.

## 2020-09-23 NOTE — TELEPHONE ENCOUNTER
Dr. Kumar Mcmanus said that is fine. Please type up the letter for the patient and let them know. Thanks!!  Danielle

## 2020-10-12 DIAGNOSIS — M25.512 ACUTE PAIN OF LEFT SHOULDER: Primary | ICD-10-CM

## 2020-10-21 ENCOUNTER — OFFICE VISIT (OUTPATIENT)
Dept: ORTHOPEDIC SURGERY | Facility: CLINIC | Age: 50
End: 2020-10-21

## 2020-10-21 VITALS — BODY MASS INDEX: 32.14 KG/M2 | HEIGHT: 73 IN | HEART RATE: 96 BPM | WEIGHT: 242.51 LBS | OXYGEN SATURATION: 99 %

## 2020-10-21 DIAGNOSIS — S46.012A RUPTURE OF LEFT SUPRASPINATUS TENDON, INITIAL ENCOUNTER: ICD-10-CM

## 2020-10-21 DIAGNOSIS — Z02.6 ENCOUNTER RELATED TO WORKER'S COMPENSATION CLAIM: Primary | ICD-10-CM

## 2020-10-21 PROCEDURE — 99214 OFFICE O/P EST MOD 30 MIN: CPT | Performed by: ORTHOPAEDIC SURGERY

## 2020-10-21 NOTE — PROGRESS NOTES
[facility] – [place] [facility] Note    Subjective     Chief Complaint   Patient presents with   • Left Shoulder - Follow-up     4 week f/u, Left Shoulder Arthroscopy with Cuff Repair and Biceps Tendon 05/21/2019        HPI  Obi Chen is a 49 y.o. male.  He reinjured his left shoulder at work over a month ago.  He is here follow-up after the MRI.  He was lifting something heavy and hammering something.  Is not getting better.  He feels like he retore his rotator cuff.  Pain is 5 out of 10.  He is on a 10 pound restriction.  He has tried physical therapy and anti-inflammatories as well.    Past Medical History:   Diagnosis Date   • Diverticulitis       Past Surgical History:   Procedure Laterality Date   • CHOLECYSTECTOMY     • GALLBLADDER SURGERY        Family History   Problem Relation Age of Onset   • No Known Problems Mother    • No Known Problems Father      Social History     Socioeconomic History   • Marital status:      Spouse name: Not on file   • Number of children: Not on file   • Years of education: Not on file   • Highest education level: Not on file   Tobacco Use   • Smoking status: Current Every Day Smoker     Packs/day: 0.50     Years: 30.00     Pack years: 15.00     Types: Cigarettes   • Smokeless tobacco: Never Used   Substance and Sexual Activity   • Alcohol use: Yes     Comment: weekly   • Drug use: No   • Sexual activity: Defer      Current Outpatient Medications on File Prior to Visit   Medication Sig Dispense Refill   • celecoxib (CeleBREX) 400 MG capsule Take 400 mg by mouth Daily.     • etodolac (LODINE) 500 MG tablet Take 500 mg by mouth As Needed.     • lansoprazole (PREVACID) 15 MG capsule Take 2 capsules by mouth Daily. 30 capsule 1   • [DISCONTINUED] ciprofloxacin (CIPRO) 500 MG tablet Take 500 mg by mouth 2 (Two) Times a Day.     • [DISCONTINUED] metroNIDAZOLE (FLAGYL) 500 MG tablet Take 500 mg by mouth 3 (Three) Times a Day.       No current facility-administered  "medications on file prior to visit.       No Known Allergies     The following portions of the patient's history were reviewed and updated as appropriate: allergies, current medications, past family history, past medical history, past social history, past surgical history and problem list.    Review of Systems   Constitutional: Negative.    HENT: Negative.    Eyes: Negative.    Respiratory: Negative.    Cardiovascular: Negative.    Gastrointestinal: Negative.    Endocrine: Negative.    Genitourinary: Negative.    Musculoskeletal: Positive for arthralgias.   Skin: Negative.    Allergic/Immunologic: Negative.    Neurological: Negative.    Hematological: Negative.    Psychiatric/Behavioral: Negative.         Objective      Physical Exam  Pulse 96   Ht 185.4 cm (72.99\")   Wt 110 kg (242 lb 8.1 oz)   SpO2 99%   BMI 32.00 kg/m²     Body mass index is 32 kg/m².    GENERAL APPEARANCE: awake, alert & oriented x 3, in no acute distress and well developed, well nourished  PSYCH: normal mood and affect  LUNGS:  breathing nonlabored, no wheezing  Left shoulder has full motion.  Positive impingement.  Strength is 4+ out of 5 of the rotator cuff.  No discrete tenderness.    Imaging/Studies  Imaging Results (Last 7 Days)     ** No results found for the last 168 hours. **      I Viewed his MRI from October 12 which shows a retear of his supraspinatus tendon    Assessment/Plan        ICD-10-CM ICD-9-CM   1. Encounter related to worker's compensation claim  Z02.6 V70.3   2. Rupture of left supraspinatus tendon, initial encounter  S46.012A 840.6     Plan is for left shoulder arthroscopy with revision left shoulder rotator cuff repair.  It appears he retore his rotator cuff.  He feels like it did to him.  He is not getting better and it has been a month.  He has been on restrictions.  He is tried anti-inflammatories and physical therapy.Treatment options and alternatives were discussed with patient.  Surgical risks include but are " not limited to pain, bleeding, infection, failure to relieve symptoms, need for further procedures, recurrence of symptoms, damage to healthy adjacent structures, hardware loosening/failure, stiffness, weakness, scar, blood clots/DVT/PE, loss of limb or life. We also discussed the postoperative protocol and expected outcome although no guarantees are possible with surgery. All questions were answered; the patient would like to proceed with surgical intervention.  Medical Decision Making  Management Options : over-the-counter medicine and major surgery with risk factors  Data/Risk: radiology tests and independent visualization of imaging, lab tests, or EMG/NCV    Frank Heath MD  10/21/20  08:57 EDT         EMR Dragon/Transcription disclaimer:  Much of this encounter note is an electronic transcription of spoken language to printed text. Electronic transcription of spoken language may permit erroneous, or at times, nonsensical words or phrases to be inadvertently transcribed. Although I have reviewed the note for such errors, some may still exist.

## 2020-10-29 ENCOUNTER — TELEPHONE (OUTPATIENT)
Dept: ORTHOPEDIC SURGERY | Facility: CLINIC | Age: 50
End: 2020-10-29

## 2020-10-29 NOTE — TELEPHONE ENCOUNTER
LVM with Hope letting her know TCW's response. Told her to call back with further questions.    Guerrero

## 2020-10-29 NOTE — TELEPHONE ENCOUNTER
HOPE FROM [employer] CALLED ASKING IF PATIENT NEEDED A STANDARD ABDUCTION PILLOW OR A AIRPLANE DESIGN ABDUCTION PILLOW. HOPE WOULD LIKE TO CLARIFICATION ON ORDER. HOPE CAN BE REACHED AT  [phone].

## 2020-11-02 ENCOUNTER — APPOINTMENT (OUTPATIENT)
Dept: PREADMISSION TESTING | Facility: HOSPITAL | Age: 50
End: 2020-11-02

## 2020-11-02 PROCEDURE — C9803 HOPD COVID-19 SPEC COLLECT: HCPCS

## 2020-11-02 PROCEDURE — U0004 COV-19 TEST NON-CDC HGH THRU: HCPCS

## 2020-11-03 ENCOUNTER — TELEPHONE (OUTPATIENT)
Dept: ORTHOPEDIC SURGERY | Facility: CLINIC | Age: 50
End: 2020-11-03

## 2020-11-03 LAB — SARS-COV-2 RNA RESP QL NAA+PROBE: NOT DETECTED

## 2020-11-03 NOTE — TELEPHONE ENCOUNTER
I called Hope from [employer] to let her know that Dr. Heath said ... That is fine, she understood.

## 2020-11-03 NOTE — TELEPHONE ENCOUNTER
HOPE FROM [employer] WAS CALLING IN REGARDS TO THE GAME READY AND BRACING THAT WAS SUBMITTED FOR THIS PATIENT. SHE IS ONLY APPROVING 14 DAYS NOT 21 DAYS AND IF THIS IS OKAY WITH DR. MILLIGAN SHE WILL SEND OVER THE AUTH FOR IT. IF NOT, THERE WILL BE A PEER TO PEER THAT WILL NEED TO BE SET UP. SHE CAN BE REACHED [phone]

## 2020-11-03 NOTE — TELEPHONE ENCOUNTER
HOPE FROM [employer] CALLED STATED SHE APPROVED THE GAME READY BUT PATIENT HAS DECLINED.  HOPE CAN BE REACHED [phone] WITH ANY QUESTIONS.

## 2020-11-03 NOTE — TELEPHONE ENCOUNTER
Dr. Heath,  Please see message below and advise, thank you.   Patient has dementia so admission questions are not completed at this time.  No family member with patient

## 2020-11-05 ENCOUNTER — OUTSIDE FACILITY SERVICE (OUTPATIENT)
Dept: ORTHOPEDIC SURGERY | Facility: CLINIC | Age: 50
End: 2020-11-05

## 2020-11-05 DIAGNOSIS — Z98.890 S/P SHOULDER SURGERY: Primary | ICD-10-CM

## 2020-11-05 PROCEDURE — 29827 SHO ARTHRS SRG RT8TR CUF RPR: CPT | Performed by: ORTHOPAEDIC SURGERY

## 2020-11-05 PROCEDURE — 15777 ACELLULAR DERM MATRIX IMPLT: CPT | Performed by: ORTHOPAEDIC SURGERY

## 2020-11-05 RX ORDER — OXYCODONE HYDROCHLORIDE AND ACETAMINOPHEN 5; 325 MG/1; MG/1
1 TABLET ORAL EVERY 6 HOURS PRN
Qty: 20 TABLET | Refills: 0 | Status: SHIPPED | OUTPATIENT
Start: 2020-11-05 | End: 2021-01-04

## 2020-11-05 RX ORDER — ONDANSETRON 4 MG/1
4 TABLET, FILM COATED ORAL EVERY 8 HOURS PRN
Qty: 10 TABLET | Refills: 1 | Status: SHIPPED | OUTPATIENT
Start: 2020-11-05 | End: 2021-01-04

## 2020-11-09 ENCOUNTER — OFFICE VISIT (OUTPATIENT)
Dept: ORTHOPEDIC SURGERY | Facility: CLINIC | Age: 50
End: 2020-11-09

## 2020-11-09 DIAGNOSIS — S46.012A RUPTURE OF LEFT SUPRASPINATUS TENDON, INITIAL ENCOUNTER: ICD-10-CM

## 2020-11-09 DIAGNOSIS — Z98.890 S/P SHOULDER SURGERY: Primary | ICD-10-CM

## 2020-11-09 DIAGNOSIS — Z02.6 ENCOUNTER RELATED TO WORKER'S COMPENSATION CLAIM: ICD-10-CM

## 2020-11-09 PROCEDURE — 99024 POSTOP FOLLOW-UP VISIT: CPT | Performed by: ORTHOPAEDIC SURGERY

## 2020-11-09 NOTE — PROGRESS NOTES
Chief Complaint   Patient presents with   • Post-op     4 days status post left shoulder arthroscopy with rotator cuff repair using Regeneten 11/05/2020           HPI  No new complaints.      There were no vitals filed for this visit.      Physical Exam:  Portals look good.  Distally neurovascular intact        ICD-10-CM ICD-9-CM   1. S/P shoulder surgery  Z98.890 V45.89   2. Encounter related to worker's compensation claim  Z02.6 V70.3   3. Rupture of left supraspinatus tendon, initial encounter  S46.012A 840.6     Continue sling.  He had a suture repair with collagen implant.  Follow-up in 3 weeks.  Work restriction no use left arm.

## 2020-11-30 ENCOUNTER — OFFICE VISIT (OUTPATIENT)
Dept: ORTHOPEDIC SURGERY | Facility: CLINIC | Age: 50
End: 2020-11-30

## 2020-11-30 DIAGNOSIS — S46.012A RUPTURE OF LEFT SUPRASPINATUS TENDON, INITIAL ENCOUNTER: ICD-10-CM

## 2020-11-30 DIAGNOSIS — Z02.6 ENCOUNTER RELATED TO WORKER'S COMPENSATION CLAIM: Primary | ICD-10-CM

## 2020-11-30 DIAGNOSIS — F17.210 CIGARETTE SMOKER: ICD-10-CM

## 2020-11-30 DIAGNOSIS — Z98.890 S/P SHOULDER SURGERY: ICD-10-CM

## 2020-11-30 PROCEDURE — 99024 POSTOP FOLLOW-UP VISIT: CPT | Performed by: ORTHOPAEDIC SURGERY

## 2020-11-30 NOTE — PROGRESS NOTES
Chief Complaint   Patient presents with   • Post-op Follow-up     3 week follow up  1 month S/P left shoulder arthroscopy with rotator cuff repair using Regeneten 11/05/2020           HPI  Is doing well.  He feels better already.  He has been wearing the sling.  He is a smoker.  I encouraged him to stop smoking as that would help healing with his revision cuff repair    There were no vitals filed for this visit.      Physical Exam:  Portals are good.  Distally neurovascular intact.        ICD-10-CM ICD-9-CM   1. Encounter related to worker's compensation claim  Z02.6 V70.3   2. S/P shoulder surgery  Z98.890 V45.89   3. Rupture of left supraspinatus tendon, initial encounter  S46.012A 840.6   4. Cigarette smoker  F17.210 305.1       Orders Placed This Encounter   Procedures   • Ambulatory Referral to Physical Therapy   I reiterated his restrictions of no use left arm.  Motion only.  Status post revision left shoulder cuff repair with suture anchors and collagen implant.  Follow-up in 1 month.  Work restriction no use left arm.  After the visit I called both with Hope his .  She informed me that he had an woodworking making bunk beds and [place] presents for children and grandchildren.  Obviously he cannot be using his arm to do that and he assured her that he was not using his left arm when he was doing woodworking.

## 2021-01-04 ENCOUNTER — OFFICE VISIT (OUTPATIENT)
Dept: ORTHOPEDIC SURGERY | Facility: CLINIC | Age: 51
End: 2021-01-04

## 2021-01-04 DIAGNOSIS — Z98.890 S/P SHOULDER SURGERY: ICD-10-CM

## 2021-01-04 DIAGNOSIS — F17.210 CIGARETTE SMOKER: ICD-10-CM

## 2021-01-04 DIAGNOSIS — S46.012A RUPTURE OF LEFT SUPRASPINATUS TENDON, INITIAL ENCOUNTER: ICD-10-CM

## 2021-01-04 DIAGNOSIS — Z02.6 ENCOUNTER RELATED TO WORKER'S COMPENSATION CLAIM: Primary | ICD-10-CM

## 2021-01-04 PROCEDURE — 99024 POSTOP FOLLOW-UP VISIT: CPT | Performed by: ORTHOPAEDIC SURGERY

## 2021-01-04 NOTE — PROGRESS NOTES
Chief Complaint   Patient presents with   • Post-op     5 weeks- 8.5 weeks S/P left shoulder arthroscopy with rotator cuff repair using Regeneten 11/05/2020           HPI  He is 2 months out from his left shoulder rotator cuff repair.  Doing well.      There were no vitals filed for this visit.      Physical Exam:  Has full motion.  Strength is 4-5      ICD-10-CM ICD-9-CM   1. Encounter related to worker's compensation claim  Z02.6 V70.3   2. S/P shoulder surgery  Z98.890 V45.89   3. Rupture of left supraspinatus tendon, initial encounter  S46.012A 840.6   4. Cigarette smoker  F17.210 305.1       Orders Placed This Encounter   Procedures   • Ambulatory Referral to Physical Therapy     He will continue physical therapy.  Work restriction no lifting more than 10 pounds.  Follow-up in 1 month.

## 2021-01-07 ENCOUNTER — TELEPHONE (OUTPATIENT)
Dept: ORTHOPEDIC SURGERY | Facility: CLINIC | Age: 51
End: 2021-01-07

## 2021-01-07 NOTE — TELEPHONE ENCOUNTER
Caller: [employer]  Relationship to Patient:[employer]     Phone Number: [phone]  Reason for Call: [employer] IS CALLING TO LET THE DOCTOR KNOW THAT THEY HAVE APPROVED MORE PHYSICAL THERAPY FOR THE PATIENT

## 2021-02-03 ENCOUNTER — OFFICE VISIT (OUTPATIENT)
Dept: ORTHOPEDIC SURGERY | Facility: CLINIC | Age: 51
End: 2021-02-03

## 2021-02-03 VITALS — WEIGHT: 249.2 LBS | OXYGEN SATURATION: 99 % | BODY MASS INDEX: 33.03 KG/M2 | HEART RATE: 92 BPM | HEIGHT: 73 IN

## 2021-02-03 DIAGNOSIS — S46.012D RUPTURE OF LEFT SUPRASPINATUS TENDON, SUBSEQUENT ENCOUNTER: ICD-10-CM

## 2021-02-03 DIAGNOSIS — Z02.6 ENCOUNTER RELATED TO WORKER'S COMPENSATION CLAIM: Primary | ICD-10-CM

## 2021-02-03 DIAGNOSIS — Z98.890 S/P SHOULDER SURGERY: ICD-10-CM

## 2021-02-03 PROCEDURE — 99024 POSTOP FOLLOW-UP VISIT: CPT | Performed by: ORTHOPAEDIC SURGERY

## 2021-02-03 NOTE — PROGRESS NOTES
Chief Complaint   Patient presents with   • Follow-up     4  week f/u- 12.5 weeks S/P left shoulder arthroscopy with rotator cuff repair using Regeneten 11/05/2020           HPI    He says he is doing better.  The other day had a pop and has had some pain ever since.  He says he just moved his arm in a pop.    Vitals:    02/03/21 0839   Pulse: 92   SpO2: 99%         Physical Exam:  He has full motion.  Strength is 4-5.        ICD-10-CM ICD-9-CM   1. Encounter related to worker's compensation claim  Z02.6 V70.3   2. S/P shoulder surgery  Z98.890 V45.89   3. Rupture of left supraspinatus tendon, subsequent encounter  S46.012D V58.89     840.6       Orders Placed This Encounter   Procedures   • Ambulatory Referral to Physical Therapy   He will continue therapy.  His work restriction is no lift over 10 pounds.  Follow-up in 1 month.     ASSESSMENT:   79 y/o RH Egyptian man PMH diabetes II, HTN, HPLD, history of previous strokes who presented with confusion found on his bathroom floor. Of note, he was recently admitted to [facility] on 9/20 and was discharged on 9/26/17 for sigmoid diverticulitis with pelvic abscess s/p drainage. CT brain on admission showed left posterior caudate intraparenchymal hemorrhage with extension into CR. MRI brain showed stable left posterior caudate with extension into the corona radiata and multiple old microhemorrhages predominantly involving deep  territories and mild-to-moderate leukoaraiosis. MRA head and neck  did not show any evidence of vascular malformation being the etiology of his ICH or any significant intracranial or extracranial large vessel severe stenosis or occlusion.    Impression:   Non-traumatic left hemispheric subcortically located ICH. Left posterior caudate ICH with extension into corona radiata - likely etiology being hypertensive ICH versus underlying vascular malformation like cavernoma, favoring the former    NEURO: neurologically without acute change, continue close monitoring for neurologic deterioration in setting of cerebral edema with mass effect and brain compression, repeat imaging 2-4 weeks, BP goal - gradual normotension,  aspirin and continue home dose statin for secondary stroke prevention. Physical therapy/OT eval for home with home therapy.     ANTITHROMBOTIC THERAPY:  ASA due to recent cardiac stents and demonstration of stable hematoma (benefits outweigh potential risks), plan on starting Plavix given cardiac history in 1 week from onset.      PULMONARY: CXR (09/29) clear, protecting airway, saturating well     CARDIOVASCULAR:  TTE to evaluate for LVH suggestive of long-standing hypertension - which could be performed as outpatient, cardiac monitoring no events,  Dr. Rahul Noland office (pt's private cardiologist)aware of admission. Continue current cardiac meds for BP management and titrate accordingly, Dr. Lee consult appreciated here.  Right sided chest pressure and noted ST elevations on telemetry appear to be baseline, no cardio contraindication noted by cardiologist.   CE negative thus far.               GASTROINTESTINAL: Dysphagia screen passed tolerating well, Pelvic abscess: as per surgery team continue Augmentin d/c 10/02 and plan to obtain CT abd/pelvis on 10/05 to eval drain       Diet: Regular (low fiber)    RENAL: BUN/Cr without previously noted changes, good urine output, hyponatremia without acute change, NaCl tabs , renal follow up as outpatient.     Na Goal: Greater than 135     Mei: N    HEMATOLOGY: Hgb and Hct without acute changes, last hospital stay anemia determined to be microcytic anemia and iron studies consistent with iron deficiency. Plan was that taking into consideration MCV, and given nationality/heritage patient was to be followed outpt PMD for electrophoresis r/o beta thalassemia minor prior to starting iron sulfate.     DVT ppx: LMWH     ID: Afebrile, no leukocytosis     DISPOSITION: D/C with home OT/PT to be discharged once home care in place.    CORE MEASURES:        Admission NIHSS: 1     TPA:  NO      LDL/HDL: 65/46     Depression Screen: 0     Statin Therapy: Y     Dysphagia Screen: PASS      Smoking NO      Afib  NO     [employer] Education YES ASSESSMENT:   77 y/o RH [place]ian man PMH diabetes II, HTN, HPLD, history of previous strokes who presented with confusion found on his bathroom floor. Of note, he was recently admitted to [facility] on 9/20 and was discharged on 9/26/17 for sigmoid diverticulitis with pelvic abscess s/p drainage. CT brain on admission showed left posterior caudate intraparenchymal hemorrhage with extension into CR. MRI brain showed stable left posterior caudate with extension into the corona radiata and multiple old microhemorrhages predominantly involving deep  territories and mild-to-moderate leukoaraiosis. MRA head and neck  did not show any evidence of vascular malformation being the etiology of his ICH or any significant intracranial or extracranial large vessel severe stenosis or occlusion.    Impression:   Non-traumatic left hemispheric subcortically located ICH. Left posterior caudate ICH with extension into corona radiata - likely etiology being hypertensive ICH versus underlying vascular malformation like cavernoma, favoring the former    NEURO: neurologically without acute change, continue close monitoring for neurologic deterioration in setting of cerebral edema with mass effect and brain compression, repeat imaging 2-4 weeks, BP goal - gradual normotension,  aspirin and continue home dose statin for secondary stroke prevention. Physical therapy/OT eval for home with home therapy.     ANTITHROMBOTIC THERAPY:  ASA due to recent cardiac stents and demonstration of stable hematoma (benefits outweigh potential risks), plan on starting Plavix given cardiac history in 1 week from onset based on clinical and radiological improvement     PULMONARY: CXR (09/29) clear, protecting airway, saturating well     CARDIOVASCULAR:  TTE to evaluate for LVH suggestive of long-standing hypertension - which could be performed as outpatient, cardiac monitoring no events,  Dr. Rahul Noland office (pt's private cardiologist) aware of admission. Continue current cardiac meds for BP management and titrate accordingly, Dr. Lee consult appreciated here. Right sided chest pressure and noted ST elevations on telemetry appear to be baseline, no cardio contraindication noted by cardiologist.   CE negative thus far.               GASTROINTESTINAL: Dysphagia screen passed tolerating well, Pelvic abscess: as per surgery team continue Augmentin d/c 10/02 and plan to obtain CT abd/pelvis on 10/05 to eval drain       Diet: Regular (low fiber)    RENAL: BUN/Cr without previously noted changes, good urine output, hyponatremia without acute change, NaCl tabs , renal follow up as outpatient.     Na Goal: Greater than 135     Mei: N    HEMATOLOGY: Hgb and Hct without acute changes, last hospital stay anemia determined to be microcytic anemia and iron studies consistent with iron deficiency. Plan was that taking into consideration MCV, and given nationality/heritage patient was to be followed outpt PMD for electrophoresis r/o beta thalassemia minor prior to starting iron sulfate.   DVT ppx: LMWH     ID: Afebrile, no leukocytosis     DISPOSITION: D/C with home OT/PT to be discharged once home care in place.    CORE MEASURES:        Admission NIHSS: 1     TPA:  NO      LDL/HDL: 65/46     Depression Screen: 0     Statin Therapy: Y     Dysphagia Screen: PASS      Smoking NO      Afib  NO     [employer] Education YES

## 2021-03-03 ENCOUNTER — OFFICE VISIT (OUTPATIENT)
Dept: ORTHOPEDIC SURGERY | Facility: CLINIC | Age: 51
End: 2021-03-03

## 2021-03-03 VITALS — HEIGHT: 73 IN | HEART RATE: 72 BPM | BODY MASS INDEX: 33.13 KG/M2 | WEIGHT: 250 LBS | OXYGEN SATURATION: 98 %

## 2021-03-03 DIAGNOSIS — S46.012D RUPTURE OF LEFT SUPRASPINATUS TENDON, SUBSEQUENT ENCOUNTER: ICD-10-CM

## 2021-03-03 DIAGNOSIS — Z02.6 ENCOUNTER RELATED TO WORKER'S COMPENSATION CLAIM: Primary | ICD-10-CM

## 2021-03-03 DIAGNOSIS — Z98.890 S/P SHOULDER SURGERY: ICD-10-CM

## 2021-03-03 PROCEDURE — 99213 OFFICE O/P EST LOW 20 MIN: CPT | Performed by: ORTHOPAEDIC SURGERY

## 2021-03-03 NOTE — PROGRESS NOTES
"      [facility] [place] Orthopaedic Surgery Clinic Note    Subjective     CC: Follow-up of the Left Shoulder (1 month f/u, 4 month S/P left shoulder arthroscopy with rotator cuff repair using Regeneten 11/05/2020)      CHRISTOPHER Chen is a 50 y.o. male.  He says he is doing better.  He still has pain and inflammation.  He goes to [facility] physical therapy.  He has good days and bad days.    Review of Systems   Constitutional: Negative.  Negative for chills, fatigue and fever.   HENT: Negative.  Negative for congestion and dental problem.    Eyes: Negative.  Negative for blurred vision.   Respiratory: Negative.  Negative for shortness of breath.    Cardiovascular: Negative.  Negative for leg swelling.   Gastrointestinal: Negative.  Negative for abdominal pain.   Endocrine: Negative.  Negative for polyuria.   Genitourinary: Negative.  Negative for difficulty urinating.   Musculoskeletal: Positive for arthralgias.   Skin: Negative.    Allergic/Immunologic: Negative.    Neurological: Negative.    Hematological: Negative.  Negative for adenopathy.   Psychiatric/Behavioral: Negative.  Negative for behavioral problems.       ROS:    Constiutional:Pt denies fever, chills, nausea, or vomiting.  MSK:as above      Objective      Past Medical History  Past Medical History:   Diagnosis Date   • Diverticulitis          Physical Exam  Pulse 72   Ht 185.4 cm (72.99\")   Wt 113 kg (250 lb)   SpO2 98%   BMI 32.99 kg/m²     Body mass index is 32.99 kg/m².    Patient is well nourished and well developed.        Ortho Exam  His shoulder has full motion.  Strength is 4 out of 5.  Positive impingement    Imaging/Labs/EMG Reviewed:  Imaging Results (Last 24 Hours)     ** No results found for the last 24 hours. **          Assessment:  1. Encounter related to worker's compensation claim    2. S/P shoulder surgery    3. Rupture of left supraspinatus tendon, subsequent encounter        Plan:  1. Recommend over the counter anti-inflammatories for " pain and/or swelling  2. He is slowly improving.  He will continue physical therapy.  Follow-up in a month.  Continue restrictions of no lifting over 10 pounds.  We will go slow.  This is his second surgery.    Follow Up:   Return in about 1 month (around 4/3/2021).      Medical Decision Making  Management Options : Low - OTC Drugs and OT or PT Therapy         Frank Heath M.D., [facility]  Orthopedic Surgeon  Fellowship Trained Sports Medicine  [facility]  Orthopedics and Sports Medicine  [address] [place] [address], [address]  [place] [address]

## 2021-04-07 ENCOUNTER — OFFICE VISIT (OUTPATIENT)
Dept: ORTHOPEDIC SURGERY | Facility: CLINIC | Age: 51
End: 2021-04-07

## 2021-04-07 VITALS
DIASTOLIC BLOOD PRESSURE: 92 MMHG | SYSTOLIC BLOOD PRESSURE: 150 MMHG | WEIGHT: 250.6 LBS | BODY MASS INDEX: 33.21 KG/M2 | HEIGHT: 73 IN

## 2021-04-07 DIAGNOSIS — Z02.6 ENCOUNTER RELATED TO WORKER'S COMPENSATION CLAIM: Primary | ICD-10-CM

## 2021-04-07 DIAGNOSIS — Z98.890 S/P SHOULDER SURGERY: ICD-10-CM

## 2021-04-07 DIAGNOSIS — S46.012D RUPTURE OF LEFT SUPRASPINATUS TENDON, SUBSEQUENT ENCOUNTER: ICD-10-CM

## 2021-04-07 PROCEDURE — 99213 OFFICE O/P EST LOW 20 MIN: CPT | Performed by: ORTHOPAEDIC SURGERY

## 2021-04-07 NOTE — PROGRESS NOTES
"      [facility] [place] Orthopaedic Surgery Clinic Note    Subjective     CC: Follow-up (1 month f/u, 5 month S/P left shoulder arthroscopy with rotator cuff repair using Regeneten 11/05/2020)      CHRISTOPHER Chen is a 50 y.o. male.  He is doing much better.  He still has good days bad days.  Lifting over his shoulder and overhead or repetitive motion bothers him the most.    Review of Systems   Constitutional: Negative.  Negative for chills, fatigue and fever.   HENT: Negative.  Negative for congestion and dental problem.    Eyes: Negative.  Negative for blurred vision.   Respiratory: Negative.  Negative for shortness of breath.    Cardiovascular: Negative.  Negative for leg swelling.   Gastrointestinal: Negative.  Negative for abdominal pain.   Endocrine: Negative.  Negative for polyuria.   Genitourinary: Negative.  Negative for difficulty urinating.   Musculoskeletal: Positive for arthralgias.   Skin: Negative.    Allergic/Immunologic: Negative.    Neurological: Negative.    Hematological: Negative.  Negative for adenopathy.   Psychiatric/Behavioral: Negative.  Negative for behavioral problems.       ROS:    Constiutional:Pt denies fever, chills, nausea, or vomiting.  MSK:as above      Objective      Past Medical History  Past Medical History:   Diagnosis Date   • Diverticulitis          Physical Exam  /92   Ht 185.4 cm (73\")   Wt 114 kg (250 lb 9.6 oz)   BMI 33.06 kg/m²     Body mass index is 33.06 kg/m².    Patient is well nourished and well developed.        Ortho Exam  Left shoulder has full motion.  4+ out of 5 strength.    Imaging/Labs/EMG Reviewed:  Imaging Results (Last 24 Hours)     ** No results found for the last 24 hours. **          Assessment:  1. Encounter related to worker's compensation claim    2. S/P shoulder surgery    3. Rupture of left supraspinatus tendon, subsequent encounter        Plan:  1. Recommend over the counter anti-inflammatories for pain and/or swelling  2. He will continue " physical therapy.  Follow-up in a month.  Anticipate MMI at 6 months postoperative.  3. His work restrictions are no overhead lifting on the left.  No lift over 20 pounds and no repetitive use left arm    Follow Up:   Return in about 1 month (around 5/7/2021).      Medical Decision Making  Management Options : Low - OT or PT Therapy         Frank Heath M.D., [facility]  Orthopedic Surgeon  Fellowship Trained Sports Medicine  [facility]  Orthopedics and Sports Medicine  [address] [place] [address], [address] [place] [address]. [id number]

## 2021-05-07 ENCOUNTER — OFFICE VISIT (OUTPATIENT)
Dept: ORTHOPEDIC SURGERY | Facility: CLINIC | Age: 51
End: 2021-05-07

## 2021-05-07 VITALS
DIASTOLIC BLOOD PRESSURE: 99 MMHG | WEIGHT: 246.6 LBS | BODY MASS INDEX: 32.68 KG/M2 | HEART RATE: 79 BPM | HEIGHT: 73 IN | SYSTOLIC BLOOD PRESSURE: 161 MMHG

## 2021-05-07 DIAGNOSIS — S46.012D RUPTURE OF LEFT SUPRASPINATUS TENDON, SUBSEQUENT ENCOUNTER: ICD-10-CM

## 2021-05-07 DIAGNOSIS — Z98.890 S/P SHOULDER SURGERY: ICD-10-CM

## 2021-05-07 DIAGNOSIS — Z02.6 ENCOUNTER RELATED TO WORKER'S COMPENSATION CLAIM: Primary | ICD-10-CM

## 2021-05-07 PROCEDURE — 99213 OFFICE O/P EST LOW 20 MIN: CPT | Performed by: ORTHOPAEDIC SURGERY

## 2021-05-07 RX ORDER — LINACLOTIDE 145 UG/1
CAPSULE, GELATIN COATED ORAL
COMMUNITY
Start: 2021-04-23

## 2021-05-07 NOTE — PROGRESS NOTES
"      [facility] [place]    Subjective     CC: Follow-up (1 month f/u--6 months S/P left shoulder arthroscopy with rotator cuff repair using Regeneten 11/05/2020)      CHRISTOPHER Chen is a 50 y.o. male.  He is going slow.  He had a revision cuff repair November 5.  He says is not doing as well as for surgery.  He is still weak.    Review of Systems   Constitutional: Negative.  Negative for chills, fatigue and fever.   HENT: Negative.  Negative for congestion and dental problem.    Eyes: Negative.  Negative for blurred vision.   Respiratory: Negative.  Negative for shortness of breath.    Cardiovascular: Negative.  Negative for leg swelling.   Gastrointestinal: Negative.  Negative for abdominal pain.   Endocrine: Negative.  Negative for polyuria.   Genitourinary: Negative.  Negative for difficulty urinating.   Musculoskeletal: Positive for arthralgias.   Skin: Negative.    Allergic/Immunologic: Negative.    Neurological: Negative.    Hematological: Negative.  Negative for adenopathy.   Psychiatric/Behavioral: Negative.  Negative for behavioral problems.       ROS:    Constiutional:Pt denies fever, chills, nausea, or vomiting.  MSK:as above      Objective      Past Medical History  Past Medical History:   Diagnosis Date   • Diverticulitis          Physical Exam  /99   Pulse 79   Ht 185.4 cm (72.99\")   Wt 112 kg (246 lb 9.6 oz)   BMI 32.54 kg/m²     Body mass index is 32.54 kg/m².    Patient is well nourished and well developed.        Ortho Exam  He has full motion with 4+ out of 5 rotator cuff strength.    Imaging/Labs/EMG Reviewed:  Imaging Results (Last 24 Hours)     ** No results found for the last 24 hours. **          Assessment:  1. Encounter related to worker's compensation claim    2. S/P shoulder surgery    3. Rupture of left supraspinatus tendon, subsequent encounter        Plan:  1. Recommend over the counter anti-inflammatories for pain and/or swelling  2. He will continue " physical therapy and follow-up in 5 weeks.    Follow Up:   Return in about 5 weeks (around 6/11/2021).      Medical Decision Making  Management Options : Low - OT or PT Therapy         Frank Heath M.D., [facility]  Orthopedic Surgeon  Fellowship Trained Sports Medicine  [facility]  Orthopedics and Sports Medicine  [address] [place] [address], [address]  [place] [address]

## 2021-06-11 ENCOUNTER — TELEPHONE (OUTPATIENT)
Dept: ORTHOPEDIC SURGERY | Facility: CLINIC | Age: 51
End: 2021-06-11

## 2021-06-11 ENCOUNTER — OFFICE VISIT (OUTPATIENT)
Dept: ORTHOPEDIC SURGERY | Facility: CLINIC | Age: 51
End: 2021-06-11

## 2021-06-11 VITALS
WEIGHT: 246.91 LBS | BODY MASS INDEX: 32.72 KG/M2 | HEART RATE: 80 BPM | SYSTOLIC BLOOD PRESSURE: 150 MMHG | DIASTOLIC BLOOD PRESSURE: 80 MMHG | HEIGHT: 73 IN

## 2021-06-11 DIAGNOSIS — Z98.890 S/P LEFT ROTATOR CUFF REPAIR: ICD-10-CM

## 2021-06-11 DIAGNOSIS — Z02.6 ENCOUNTER RELATED TO WORKER'S COMPENSATION CLAIM: Primary | ICD-10-CM

## 2021-06-11 DIAGNOSIS — M25.812 OS ACROMIALE OF LEFT SHOULDER: ICD-10-CM

## 2021-06-11 DIAGNOSIS — F17.210 CIGARETTE SMOKER: ICD-10-CM

## 2021-06-11 PROCEDURE — 99213 OFFICE O/P EST LOW 20 MIN: CPT | Performed by: ORTHOPAEDIC SURGERY

## 2021-06-11 RX ORDER — METRONIDAZOLE 500 MG/1
500 TABLET ORAL 3 TIMES DAILY
COMMUNITY
Start: 2021-06-04

## 2021-06-11 RX ORDER — MELOXICAM 15 MG/1
TABLET ORAL
Qty: 90 TABLET | Refills: 2 | Status: SHIPPED | OUTPATIENT
Start: 2021-06-11

## 2021-06-11 RX ORDER — CIPROFLOXACIN 500 MG/1
500 TABLET, FILM COATED ORAL 2 TIMES DAILY
COMMUNITY
Start: 2021-06-04

## 2021-06-11 NOTE — TELEPHONE ENCOUNTER
Caller: SYDNEY ROSS     Relationship: SELF     Best call back number: [phone]    What is the best time to reach you: ANYTIME     What was the call regarding: PATIENT STATED HE IS CURRENTLY TAKING CELEBREX PRESCRIBED BY HIS PCP- PATIENT WOULD LIKE TO KNOW IF DR MILLIGAN CAN PRESCRIBE ANOTHER ANTI INFLAMMATORY; PT'S WORK COMP NURSE RECOMMENDED MELOXICAM SINCE ITS EASIER ON THE STOMACH- PLEASE CALL PATIENT BACK     Do you require a callback: YES

## 2021-06-11 NOTE — PROGRESS NOTES
"      [facility] [place] [place]    Subjective     CC: Follow-up (1 month f/u--7 months S/P left shoulder arthroscopy with rotator cuff repair using Regeneten 11/05/2020)      CHRISTOPHER Chen is a 50 y.o. male.  He is now 7 months out from his left shoulder revision cuff repair with regenerative patch.  He is going slow.  He still has some pain and aching.  He is working on his restrictions.  His first surgery he says is doing very well with no pain but he is having some prolonged pain after the surgery.    Review of Systems   Constitutional: Negative.  Negative for chills, fatigue and fever.   HENT: Negative.  Negative for congestion and dental problem.    Eyes: Negative.  Negative for blurred vision.   Respiratory: Negative.  Negative for shortness of breath.    Cardiovascular: Negative.  Negative for leg swelling.   Gastrointestinal: Negative.  Negative for abdominal pain.   Endocrine: Negative.  Negative for polyuria.   Genitourinary: Negative.  Negative for difficulty urinating.   Musculoskeletal: Positive for arthralgias.   Skin: Negative.    Allergic/Immunologic: Negative.    Neurological: Negative.    Hematological: Negative.  Negative for adenopathy.   Psychiatric/Behavioral: Negative.  Negative for behavioral problems.       ROS:    Constiutional:Pt denies fever, chills, nausea, or vomiting.  MSK:as above      Objective      Past Medical History  Past Medical History:   Diagnosis Date   • Diverticulitis          Physical Exam  /80   Pulse 80   Ht 185.4 cm (72.99\")   Wt 112 kg (246 lb 14.6 oz)   BMI 32.58 kg/m²     Body mass index is 32.58 kg/m².    Patient is well nourished and well developed.        Ortho Exam  He has some full motion with positive impingement.  4+ out of 5 strength of the rotator cuff.    Imaging/Labs/EMG Reviewed:  Imaging Results (Last 24 Hours)     ** No results found for the last 24 hours. **          Assessment:  1. Encounter related to worker's compensation " claim    2. S/P left rotator cuff repair    3. Cigarette smoker    4. Os acromiale of left shoulder        Plan:  1. Recommend over the counter anti-inflammatories for pain and/or swelling  2. He is going slow with a revision cuff repair.  Continue physical therapy.  Continue work restrictions no lifting more than 20 pounds.  Follow-up in 6 weeks.  He does have an os acromiale but it does not appear to be unstable.  There is no bone marrow edema on previous MRIs and he did well with his first cuff repair.  It is something to consider however if he does not make a full improvement    Follow Up:   Return in about 6 weeks (around 7/23/2021).      Medical Decision Making  Management Options : Low - OT or PT Therapy         Frank Heath M.D., [facility]  Orthopedic Surgeon  Fellowship Trained Sports Medicine  [facility]  Orthopedics and Sports Medicine  [address] [place] [address], [address]  [place] [address]

## 2021-07-21 ENCOUNTER — OFFICE VISIT (OUTPATIENT)
Dept: ORTHOPEDIC SURGERY | Facility: CLINIC | Age: 51
End: 2021-07-21

## 2021-07-21 VITALS
HEART RATE: 89 BPM | DIASTOLIC BLOOD PRESSURE: 84 MMHG | BODY MASS INDEX: 33.13 KG/M2 | SYSTOLIC BLOOD PRESSURE: 144 MMHG | HEIGHT: 73 IN | WEIGHT: 250 LBS

## 2021-07-21 DIAGNOSIS — Z98.890 S/P LEFT ROTATOR CUFF REPAIR: ICD-10-CM

## 2021-07-21 DIAGNOSIS — F17.210 CIGARETTE SMOKER: ICD-10-CM

## 2021-07-21 DIAGNOSIS — M25.812 OS ACROMIALE OF LEFT SHOULDER: ICD-10-CM

## 2021-07-21 DIAGNOSIS — Z02.6 ENCOUNTER RELATED TO WORKER'S COMPENSATION CLAIM: Primary | ICD-10-CM

## 2021-07-21 PROCEDURE — 99213 OFFICE O/P EST LOW 20 MIN: CPT | Performed by: ORTHOPAEDIC SURGERY

## 2021-07-21 NOTE — PROGRESS NOTES
"      [facility] [place] [facility] [place]    Subjective     CC: Follow-up (6 weeks follow up; 8 months S/P left shoulder arthroscopy with rotator cuff repair using Regeneten 11/05/2020)      CHRISTOPHER Chen is a 50 y.o. male.  He is 9 months out from his left shoulder revision surgery.  He says some things are better and some things are not. he is going to physical therapy.  He says his motion is better.  He still has pain after physical therapy    Review of Systems   Constitutional: Negative.  Negative for chills, fatigue and fever.   HENT: Negative.  Negative for congestion and dental problem.    Eyes: Negative.  Negative for blurred vision.   Respiratory: Negative.  Negative for shortness of breath.    Cardiovascular: Negative.  Negative for leg swelling.   Gastrointestinal: Negative.  Negative for abdominal pain.   Endocrine: Negative.  Negative for polyuria.   Genitourinary: Negative.  Negative for difficulty urinating.   Musculoskeletal: Positive for arthralgias.   Skin: Negative.    Allergic/Immunologic: Negative.    Neurological: Negative.    Hematological: Negative.  Negative for adenopathy.   Psychiatric/Behavioral: Negative.  Negative for behavioral problems.       ROS:    Constiutional:Pt denies fever, chills, nausea, or vomiting.  MSK:as above      Objective      Past Medical History  Past Medical History:   Diagnosis Date   • Diverticulitis          Physical Exam  /84   Pulse 89   Ht 185.4 cm (72.99\")   Wt 113 kg (250 lb)   BMI 32.99 kg/m²     Body mass index is 32.99 kg/m².    Patient is well nourished and well developed.        Ortho Exam  He has full motion.  Rotator cuff strength 4-5.  He is diffusely about his biceps.  No acromial tenderness    Imaging/Labs/EMG Reviewed:  Imaging Results (Last 24 Hours)     ** No results found for the last 24 hours. **          Assessment:  1. Encounter related to worker's compensation claim    2. S/P left rotator cuff repair    3. Cigarette smoker "    4. Os acromiale of left shoulder        Plan:  1. Recommend over the counter anti-inflammatories for pain and/or swelling  2. He is approaching MMI.  I have ordered an FCE.  He will most likely need permanent restrictions.  3. He will continue physical therapy  4. I continue to encourage him to stop smoking    Follow Up:   Return for After FCE.      Medical Decision Making  Management Options : Low - OT or PT Therapy         Frank Heath M.D., [facility]  Orthopedic Surgeon  Fellowship Trained Sports Medicine  [facility]  Orthopedics and Sports Medicine  [address] [place] [address], [address]  [place] [address]    EMR Dragon/Transcription disclaimer:  Much of this encounter note is an electronic transcription of spoken language to printed text. Electronic transcription of spoken language may permit erroneous, or at times, nonsensical words or phrases to be inadvertently transcribed. Although I have reviewed the note for such errors, some may still exist.

## 2021-07-22 ENCOUNTER — TELEPHONE (OUTPATIENT)
Dept: ORTHOPEDIC SURGERY | Facility: CLINIC | Age: 51
End: 2021-07-22

## 2021-07-22 NOTE — TELEPHONE ENCOUNTER
Provider: DR. WILSON  Caller: LYDIA   Relationship to Patient: [employer]    Phone Number: [phone]  Reason for Call: [employer] WITH [employer] CALL TO INFORM DR. MILLIGAN SHE HAS APPROVED FCE. SHE ALSO STATED  WILL DR. MILLIGAN WITHDRAW ORDER FOR PHYSICAL THERAPY. PATIENT STILL HAS 3 PHYSICAL THERAPY VISIT HE HASN'T USED YET. IF DR. MILLIGAN WILL NOT WITHDRAW THE ORDER SHE NEEDS TO SCHEDULE PHYSICIAN ADVISOR PEER TO PEER.

## 2021-08-20 DIAGNOSIS — Z98.890 S/P LEFT ROTATOR CUFF REPAIR: ICD-10-CM

## 2021-08-25 ENCOUNTER — OFFICE VISIT (OUTPATIENT)
Dept: ORTHOPEDIC SURGERY | Facility: CLINIC | Age: 51
End: 2021-08-25

## 2021-08-25 VITALS
SYSTOLIC BLOOD PRESSURE: 140 MMHG | WEIGHT: 249.12 LBS | DIASTOLIC BLOOD PRESSURE: 81 MMHG | HEIGHT: 73 IN | BODY MASS INDEX: 33.02 KG/M2 | HEART RATE: 80 BPM

## 2021-08-25 DIAGNOSIS — Z98.890 S/P LEFT ROTATOR CUFF REPAIR: ICD-10-CM

## 2021-08-25 DIAGNOSIS — Z02.6 ENCOUNTER RELATED TO WORKER'S COMPENSATION CLAIM: Primary | ICD-10-CM

## 2021-08-25 PROCEDURE — 99213 OFFICE O/P EST LOW 20 MIN: CPT | Performed by: ORTHOPAEDIC SURGERY

## 2021-08-25 NOTE — PROGRESS NOTES
"      [facility] Note    Subjective     CC: Follow-up (FCE follow up; 9 months S/P left shoulder arthroscopy with rotator cuff repair using Regeneten 11/05/2020)      CHRISTOPHER Chen is a 50 y.o. male.  He completed the FCE.  He had some pain level up to a level 5 at the FCE.  I reviewed his FCE from August 16 and he gave a good maximum effort according to the report.  The full report is scanned in his chart    Review of Systems   Constitutional: Negative.  Negative for chills, fatigue and fever.   HENT: Negative.  Negative for congestion and dental problem.    Eyes: Negative.  Negative for blurred vision.   Respiratory: Negative.  Negative for shortness of breath.    Cardiovascular: Negative.  Negative for leg swelling.   Gastrointestinal: Negative.  Negative for abdominal pain.   Endocrine: Negative.  Negative for polyuria.   Genitourinary: Negative.  Negative for difficulty urinating.   Musculoskeletal: Positive for arthralgias.   Skin: Negative.    Allergic/Immunologic: Negative.    Neurological: Negative.    Hematological: Negative.  Negative for adenopathy.   Psychiatric/Behavioral: Negative.  Negative for behavioral problems.       ROS:    Constiutional:Pt denies fever, chills, nausea, or vomiting.  MSK:as above      Objective      Past Medical History  Past Medical History:   Diagnosis Date   • Diverticulitis          Physical Exam  /81   Pulse 80   Ht 185.4 cm (72.99\")   Wt 113 kg (249 lb 1.9 oz)   BMI 32.87 kg/m²     Body mass index is 32.87 kg/m².    Patient is well nourished and well developed.        Ortho Exam  Left shoulder is full motion and 5 out of 5 strength manual muscle testing    Imaging/Labs/EMG Reviewed:  Imaging Results (Last 24 Hours)     ** No results found for the last 24 hours. **      I reviewed his FCE and the results will be in the plan    Assessment:  1. Encounter related to worker's compensation claim    2. S/P left rotator cuff repair  "       Plan:  1. Recommend over the counter anti-inflammatories for pain and/or swelling  2. He met his job requirements except for in 2 locations he can only occasionally lift with his left arm 18 pounds floor to waist and only 14 pounds waist to overhead.  3. He may lift floor to waist frequently 25 pounds.  Floor to waist 50 pounds occasionally.  Waist to overhead 15 pounds frequent.  Waist overhead 25 pounds occasional unilateral lift floor to waist on the right 25 pounds on the left 18 pounds.  Unilateral lift weights overhead 15 pounds on the right and 14 pounds on the left  4. Carrying 25 pounds frequent carrying 50 pounds occasional unilateral carry 25 pounds on the right and 25 pounds on the left    Follow Up:   Return if symptoms worsen or fail to improve.      Medical Decision Making  Management Options : Low - 1 of 2 Categories = Category 1 - Review of Tests and Documents Category 2 - Assessment requiring an independent interpretation of tests         Frank Heath M.D., [facility]  Orthopedic Surgeon  Fellowship Trained Sports Medicine  [facility]  Orthopedics and Sports Medicine  [address] [place] [address], [address]  [place] [address]    EMR Dragon/Transcription disclaimer:  Much of this encounter note is an electronic transcription of spoken language to printed text. Electronic transcription of spoken language may permit erroneous, or at times, nonsensical words or phrases to be inadvertently transcribed. Although I have reviewed the note for such errors, some may still exist.

## 2021-08-26 ENCOUNTER — TELEPHONE (OUTPATIENT)
Dept: ORTHOPEDIC SURGERY | Facility: CLINIC | Age: 51
End: 2021-08-26

## 2021-08-26 NOTE — TELEPHONE ENCOUNTER
Caller: TIMOTHY    Relationship: [employer]    Best call back number: [phone]    What form or medical record are you requesting:OFFICE VISIT NOTE FOR 8/25/21 & TREATMENT REQUEST IN WRITING     Who is requesting this form or medical record from you: [employer]    How would you like to receive the form or medical records (pick-up, mail, fax):  FAX  If fax, what is the fax number:  [phone]  If mail, what is the address:   If pick-up, provide patient with address and location details    Timeframe paperwork needed: ASAP    Additional notes: TIMOTHY WITH [employer] ADVISED SHE NEEDS OFFICE VISIT NOTE FROM 8/25/21 AND TREATMENT REQUEST IN WRITING TO BE SENT VIA FAX.

## 2021-09-01 ENCOUNTER — TELEPHONE (OUTPATIENT)
Dept: ORTHOPEDIC SURGERY | Facility: CLINIC | Age: 51
End: 2021-09-01

## 2021-09-01 NOTE — TELEPHONE ENCOUNTER
Caller: INEZ    Relationship: [employer]    Best call back number: [phone]    What form or medical record are you requesting: CONFIRM PATIENT'S WORK STATUS.    Who is requesting this form or medical record from you: [employer]    How would you like to receive the form or medical records (pick-up, mail, fax):   If fax, what is the fax number: [phone]    Timeframe paperwork needed: ASAP    Additional notes: INEZ WITH [employer] WAS CALLING TO CONFIRM PATIENT'S WORK STATUS. INEZ STATED SHE HAD RECEIVED THE WORK STATUS AND DICTATION FROM PATIENT'S LAST APPT WITH DR. MILLIGAN ON 08.25.21 BUT THE WORK STATUS AND DICTATION DID NOT MATCH. [employer]EZ WAS REQUESTING A CONFIRMATION OF PATIENT'S WORK STATUS TO BE FAX TO [employer] [phone] ASAP. THANKS!

## 2021-09-01 NOTE — TELEPHONE ENCOUNTER
I spoke with Miriam at [employer] to let her know I understood the confusion but based on the actual FCE report the work status note provided is correct. She understood.     Guerrero

## 2022-06-27 ENCOUNTER — HOSPITAL ENCOUNTER (OUTPATIENT)
Dept: CARDIOLOGY | Facility: HOSPITAL | Age: 52
Discharge: HOME | End: 2022-06-27
Admitting: NURSE PRACTITIONER

## 2022-06-27 ENCOUNTER — HOSPITAL ENCOUNTER (EMERGENCY)
Facility: HOSPITAL | Age: 52
Discharge: HOME | End: 2022-06-27
Attending: EMERGENCY MEDICINE

## 2022-06-27 ENCOUNTER — APPOINTMENT (OUTPATIENT)
Dept: GENERAL RADIOLOGY | Facility: HOSPITAL | Age: 52
End: 2022-06-27

## 2022-06-27 ENCOUNTER — TRANSCRIBE ORDERS (OUTPATIENT)
Dept: ADMINISTRATIVE | Facility: HOSPITAL | Age: 52
End: 2022-06-27

## 2022-06-27 VITALS
BODY MASS INDEX: 33.86 KG/M2 | RESPIRATION RATE: 18 BRPM | TEMPERATURE: 98 F | DIASTOLIC BLOOD PRESSURE: 90 MMHG | HEIGHT: 72 IN | OXYGEN SATURATION: 98 % | WEIGHT: 250 LBS | HEART RATE: 80 BPM | SYSTOLIC BLOOD PRESSURE: 138 MMHG

## 2022-06-27 DIAGNOSIS — R07.9 CHEST PAIN, UNSPECIFIED TYPE: ICD-10-CM

## 2022-06-27 DIAGNOSIS — R07.9 CHEST PAIN, UNSPECIFIED TYPE: Primary | ICD-10-CM

## 2022-06-27 LAB
ALBUMIN SERPL-MCNC: 4.35 G/DL (ref 3.5–5.2)
ALBUMIN/GLOB SERPL: 1.6 G/DL
ALP SERPL-CCNC: 145 U/L (ref 39–117)
ALT SERPL W P-5'-P-CCNC: 35 U/L (ref 1–41)
ANION GAP SERPL CALCULATED.3IONS-SCNC: 11 MMOL/L (ref 5–15)
AST SERPL-CCNC: 33 U/L (ref 1–40)
BASOPHILS # BLD AUTO: 0.09 10*3/MM3 (ref 0–0.2)
BASOPHILS NFR BLD AUTO: 0.9 % (ref 0–1.5)
BILIRUB SERPL-MCNC: 0.3 MG/DL (ref 0–1.2)
BUN SERPL-MCNC: 9 MG/DL (ref 6–20)
BUN/CREAT SERPL: 9.9 (ref 7–25)
CALCIUM SPEC-SCNC: 9.5 MG/DL (ref 8.6–10.5)
CHLORIDE SERPL-SCNC: 105 MMOL/L (ref 98–107)
CO2 SERPL-SCNC: 23 MMOL/L (ref 22–29)
CREAT SERPL-MCNC: 0.91 MG/DL (ref 0.76–1.27)
CRP SERPL-MCNC: <0.3 MG/DL (ref 0–0.5)
DEPRECATED RDW RBC AUTO: 42.5 FL (ref 37–54)
EGFRCR SERPLBLD CKD-EPI 2021: 102 ML/MIN/1.73
EOSINOPHIL # BLD AUTO: 0.23 10*3/MM3 (ref 0–0.4)
EOSINOPHIL NFR BLD AUTO: 2.4 % (ref 0.3–6.2)
ERYTHROCYTE [DISTWIDTH] IN BLOOD BY AUTOMATED COUNT: 12.8 % (ref 12.3–15.4)
GLOBULIN UR ELPH-MCNC: 2.8 GM/DL
GLUCOSE SERPL-MCNC: 115 MG/DL (ref 65–99)
HCT VFR BLD AUTO: 46.2 % (ref 37.5–51)
HGB BLD-MCNC: 16.1 G/DL (ref 13–17.7)
HOLD SPECIMEN: NORMAL
HOLD SPECIMEN: NORMAL
IMM GRANULOCYTES # BLD AUTO: 0.06 10*3/MM3 (ref 0–0.05)
IMM GRANULOCYTES NFR BLD AUTO: 0.6 % (ref 0–0.5)
LYMPHOCYTES # BLD AUTO: 2.76 10*3/MM3 (ref 0.7–3.1)
LYMPHOCYTES NFR BLD AUTO: 28.5 % (ref 19.6–45.3)
MAGNESIUM SERPL-MCNC: 2.1 MG/DL (ref 1.6–2.6)
MCH RBC QN AUTO: 31.1 PG (ref 26.6–33)
MCHC RBC AUTO-ENTMCNC: 34.8 G/DL (ref 31.5–35.7)
MCV RBC AUTO: 89.4 FL (ref 79–97)
MONOCYTES # BLD AUTO: 0.88 10*3/MM3 (ref 0.1–0.9)
MONOCYTES NFR BLD AUTO: 9.1 % (ref 5–12)
NEUTROPHILS NFR BLD AUTO: 5.65 10*3/MM3 (ref 1.7–7)
NEUTROPHILS NFR BLD AUTO: 58.5 % (ref 42.7–76)
NRBC BLD AUTO-RTO: 0 /100 WBC (ref 0–0.2)
NT-PROBNP SERPL-MCNC: 78.4 PG/ML (ref 0–900)
PLATELET # BLD AUTO: 245 10*3/MM3 (ref 140–450)
PMV BLD AUTO: 9.2 FL (ref 6–12)
POTASSIUM SERPL-SCNC: 4.1 MMOL/L (ref 3.5–5.2)
PROT SERPL-MCNC: 7.1 G/DL (ref 6–8.5)
QT INTERVAL: 344 MS
QTC INTERVAL: 439 MS
RBC # BLD AUTO: 5.17 10*6/MM3 (ref 4.14–5.8)
SODIUM SERPL-SCNC: 139 MMOL/L (ref 136–145)
TROPONIN T SERPL-MCNC: <0.01 NG/ML (ref 0–0.03)
TROPONIN T SERPL-MCNC: <0.01 NG/ML (ref 0–0.03)
WBC NRBC COR # BLD: 9.67 10*3/MM3 (ref 3.4–10.8)
WHOLE BLOOD HOLD COAG: NORMAL
WHOLE BLOOD HOLD SPECIMEN: NORMAL

## 2022-06-27 PROCEDURE — 84484 ASSAY OF TROPONIN QUANT: CPT | Performed by: NURSE PRACTITIONER

## 2022-06-27 PROCEDURE — 71045 X-RAY EXAM CHEST 1 VIEW: CPT

## 2022-06-27 PROCEDURE — 93017 CV STRESS TEST TRACING ONLY: CPT

## 2022-06-27 PROCEDURE — 83880 ASSAY OF NATRIURETIC PEPTIDE: CPT | Performed by: NURSE PRACTITIONER

## 2022-06-27 PROCEDURE — 99283 EMERGENCY DEPT VISIT LOW MDM: CPT

## 2022-06-27 PROCEDURE — 93005 ELECTROCARDIOGRAM TRACING: CPT | Performed by: EMERGENCY MEDICINE

## 2022-06-27 PROCEDURE — 93010 ELECTROCARDIOGRAM REPORT: CPT | Performed by: INTERNAL MEDICINE

## 2022-06-27 PROCEDURE — 85025 COMPLETE CBC W/AUTO DIFF WBC: CPT | Performed by: NURSE PRACTITIONER

## 2022-06-27 PROCEDURE — 83735 ASSAY OF MAGNESIUM: CPT | Performed by: NURSE PRACTITIONER

## 2022-06-27 PROCEDURE — 80053 COMPREHEN METABOLIC PANEL: CPT | Performed by: NURSE PRACTITIONER

## 2022-06-27 PROCEDURE — 86140 C-REACTIVE PROTEIN: CPT | Performed by: NURSE PRACTITIONER

## 2022-06-27 PROCEDURE — 36415 COLL VENOUS BLD VENIPUNCTURE: CPT

## 2022-06-27 RX ORDER — ASPIRIN 81 MG/1
324 TABLET, CHEWABLE ORAL ONCE
Status: DISCONTINUED | OUTPATIENT
Start: 2022-06-27 | End: 2022-06-27 | Stop reason: HOSPADM

## 2022-06-27 RX ORDER — SODIUM CHLORIDE 0.9 % (FLUSH) 0.9 %
10 SYRINGE (ML) INJECTION AS NEEDED
Status: DISCONTINUED | OUTPATIENT
Start: 2022-06-27 | End: 2022-06-27 | Stop reason: HOSPADM

## 2022-06-28 LAB
BH CV STRESS BP STAGE 1: NORMAL
BH CV STRESS BP STAGE 2: NORMAL
BH CV STRESS DURATION MIN STAGE 1: 3
BH CV STRESS DURATION MIN STAGE 2: 3
BH CV STRESS DURATION MIN STAGE 3: 0
BH CV STRESS DURATION SEC STAGE 1: 0
BH CV STRESS DURATION SEC STAGE 2: 0
BH CV STRESS DURATION SEC STAGE 3: 40
BH CV STRESS GRADE STAGE 1: 10
BH CV STRESS GRADE STAGE 2: 12
BH CV STRESS GRADE STAGE 3: 14
BH CV STRESS HR STAGE 1: 114
BH CV STRESS HR STAGE 2: 132
BH CV STRESS HR STAGE 3: 148
BH CV STRESS METS STAGE 1: 5
BH CV STRESS METS STAGE 2: 7.5
BH CV STRESS METS STAGE 3: 10
BH CV STRESS PROTOCOL 1: NORMAL
BH CV STRESS RECOVERY BP: NORMAL MMHG
BH CV STRESS RECOVERY HR: 105 BPM
BH CV STRESS SPEED STAGE 1: 1.7
BH CV STRESS SPEED STAGE 2: 2.5
BH CV STRESS SPEED STAGE 3: 3.4
BH CV STRESS STAGE 1: 1
BH CV STRESS STAGE 2: 2
BH CV STRESS STAGE 3: 3
MAXIMAL PREDICTED HEART RATE: 169 BPM
PERCENT MAX PREDICTED HR: 87.57 %
STRESS BASELINE BP: NORMAL MMHG
STRESS BASELINE HR: 87 BPM
STRESS PERCENT HR: 103 %
STRESS POST ESTIMATED WORKLOAD: 7 METS
STRESS POST EXERCISE DUR MIN: 6 MIN
STRESS POST EXERCISE DUR SEC: 40 SEC
STRESS POST PEAK BP: NORMAL MMHG
STRESS POST PEAK HR: 148 BPM
STRESS TARGET HR: 144 BPM

## 2022-07-03 NOTE — ED PROVIDER NOTES
Subjective   Patient is a 51-year-old male with significant past medical history positive for diverticulitis history, left shoulder pain chronically and arthritis presenting to the ER complaints of chest pain.  Patient complains of left anterior chest pain starting yesterday and then resolved and a few hours ago came back.  Patient denies any alleviating or worsening factors.  Patient denies any cardiac history.  Patient denies any family history of cardiac disease.  Patient denies any shortness of breath, nausea, cough, fever, abdominal pain, back pain or any additional symptoms today.      History provided by:  Patient   used: No    Chest Pain  Pain location:  L chest  Pain quality: aching and dull    Pain radiates to:  Does not radiate  Pain severity:  Mild  Onset quality:  Gradual  Duration:  1 day  Timing:  Intermittent  Progression:  Unchanged  Chronicity:  New  Context: at rest    Context: not breathing, not drug use, not eating, not intercourse, not lifting, not movement, not raising an arm, not stress and not trauma    Relieved by:  Nothing  Worsened by:  Nothing  Ineffective treatments:  None tried  Associated symptoms: no abdominal pain, no AICD problem, no altered mental status, no anorexia, no anxiety, no back pain, no claudication, no cough, no diaphoresis, no dizziness, no dysphagia, no fatigue, no fever, no headache, no heartburn, no lower extremity edema, no nausea, no near-syncope, no numbness, no orthopnea, no palpitations, no PND, no shortness of breath, no syncope, no vomiting and no weakness    Risk factors: male sex and obesity    Risk factors: no aortic disease, no birth control, no coronary artery disease, no diabetes mellitus, no González-Danlos syndrome, no high cholesterol, no hypertension, no immobilization, no Marfan's syndrome, not pregnant, no prior DVT/PE, no smoking and no surgery        Review of Systems   Constitutional: Negative.  Negative for diaphoresis,  fatigue and fever.   HENT: Negative.  Negative for trouble swallowing.    Respiratory: Negative.  Negative for cough and shortness of breath.    Cardiovascular: Positive for chest pain. Negative for palpitations, orthopnea, claudication, syncope, PND and near-syncope.   Gastrointestinal: Negative.  Negative for abdominal pain, anorexia, heartburn, nausea and vomiting.   Endocrine: Negative.    Genitourinary: Negative.  Negative for dysuria.   Musculoskeletal: Negative for back pain.   Skin: Negative.    Neurological: Negative.  Negative for dizziness, weakness, numbness and headaches.   Psychiatric/Behavioral: Negative.    All other systems reviewed and are negative.      Past Medical History:   Diagnosis Date   • Diverticulitis        No Known Allergies    Past Surgical History:   Procedure Laterality Date   • CHOLECYSTECTOMY     • GALLBLADDER SURGERY     • SHOULDER SURGERY Left 11/05/2020    left shoulder arthroscopy with rotator cuff repair using Aruna Heath       Family History   Problem Relation Age of Onset   • No Known Problems Mother    • No Known Problems Father        Social History     Socioeconomic History   • Marital status:    Tobacco Use   • Smoking status: Current Every Day Smoker     Packs/day: 0.50     Years: 30.00     Pack years: 15.00     Types: Cigarettes   • Smokeless tobacco: Never Used   Substance and Sexual Activity   • Alcohol use: Yes     Comment: weekly   • Drug use: No   • Sexual activity: Defer           Objective   Physical Exam  Vitals and nursing note reviewed.   Constitutional:       General: He is not in acute distress.     Appearance: He is well-developed. He is obese. He is not ill-appearing, toxic-appearing or diaphoretic.   HENT:      Head: Normocephalic and atraumatic.      Right Ear: External ear normal.      Left Ear: External ear normal.      Nose: Nose normal.   Eyes:      Conjunctiva/sclera: Conjunctivae normal.      Pupils: Pupils are equal, round, and  reactive to light.   Neck:      Vascular: No JVD.      Trachea: No tracheal deviation.   Cardiovascular:      Rate and Rhythm: Normal rate and regular rhythm.      Heart sounds: Normal heart sounds. No murmur heard.  Pulmonary:      Effort: Pulmonary effort is normal. No respiratory distress.      Breath sounds: Normal breath sounds. No wheezing.   Abdominal:      General: Bowel sounds are normal.      Palpations: Abdomen is soft.      Tenderness: There is no abdominal tenderness.   Musculoskeletal:         General: No deformity. Normal range of motion.      Cervical back: Normal range of motion and neck supple.      Right lower leg: No edema.      Left lower leg: No edema.   Skin:     General: Skin is warm and dry.      Capillary Refill: Capillary refill takes less than 2 seconds.      Coloration: Skin is not pale.      Findings: No erythema or rash.   Neurological:      Mental Status: He is alert and oriented to person, place, and time.      Cranial Nerves: No cranial nerve deficit.   Psychiatric:         Mood and Affect: Mood normal. Mood is not anxious.         Behavior: Behavior normal. Behavior is not agitated.         Thought Content: Thought content normal.         Procedures           ED Course  ED Course as of 07/03/22 0231   Mon Jun 27, 2022   0608 XR Chest 1 View    IMPRESSION:  No acute cardiopulmonary findings.     Signer Name: Dc Jaquez MD   Signed: 6/27/2022 5:51 AM   Workstation Name: [email or web address]    [facility] [SM]   0640 EKG normal sinus rhythm nonspecific ST abnormalities no T wave inversion no ST segment elevation or depression normal QTC no STEMI [JM]   0828 Outpatient stress test ordered at this time.  [SM]   0829 HEART SCORE: 3 [SM]   0830 Work up and results were discussed throughly with the patient.  The patient will be discharged for further monitoring and management with their PCP and cardiology.  Red flags, warning signs, worsening symptoms, and when to  return to the ER discussed with and understood by the patient.  Patient will follow up with their PCP and cardiology in a timely manner.  Vitals stable at discharge.  [SM]   0831 This care is provided during an unprecedented national emergency due to the Novel Coronavirus (COVID-19). COVID-19 infections and transmission risks place heavy strains on healthcare resources. As this pandemic evolves, the Hospital and providers strive to respond fluidly, to remain operational, and to provide care relative to available resources and information. Outcomes are unpredictable and treatments are without well-defined guidelines. Further, the impact of COVID-19 on all aspects of emergency care, including the impact to patients seeking care for reasons other than COVID-19, is unavoidable during this national emergency.    This note was dictated using a [id number] tool. Occasional wrong-word or 'sound-a-like' substitutions may have occurred due to the inherent limitations of voice recognition software.  Read the chart carefully and recognize, using context, where substitutions have occurred.  [SM]      ED Course User Index  [JM] Vishnu Sofia MD  [SM] Shea Quiroga, APRN                                           [facility]    Final diagnoses:   Chest pain, unspecified type       ED Disposition  ED Disposition     ED Disposition   Discharge    Condition   Stable    Comment   Patient is discharged home. NADN. Patient ambulated out of the ED without difficulty.              Meng Agrawal MD  [address] [place] [address] [phone]    Schedule an appointment as soon as possible for a visit in 2 days      Emily Mcnamara MD  [address] [place] [address] [id number] [phone]    Schedule an appointment as soon as possible for a visit in 2 days           Medication List      No changes were made to your prescriptions during this visit.          Shea Quiroga, KEVIN  07/03/22 [id number]

## 2023-01-12 NOTE — PROGRESS NOTES
Chief Complaint   Patient presents with   • Post-op     1 week status post Left Shoulder Arthroscopy with Cuff Repair and Biceps Tendon 05/21/2019           HPI    He is follow-up left shoulder cuff repair doing well.    There were no vitals filed for this visit.      Physical Exam:    The portals look good.  He is neurovascular intact.          ICD-10-CM ICD-9-CM   1. Encounter related to worker's compensation claim Z02.6 V70.3   2. Status post left rotator cuff repair Z98.890 V45.89   He will follow-up in 3 weeks to start physical therapy.  He is work restrictions no use left arm.  He will continue the sling.  
[phone]

## 2023-09-27 ENCOUNTER — TRANSCRIBE ORDERS (OUTPATIENT)
Dept: ADMINISTRATIVE | Facility: HOSPITAL | Age: 53
End: 2023-09-27
Payer: COMMERCIAL